# Patient Record
Sex: FEMALE | Race: WHITE | Employment: FULL TIME | URBAN - METROPOLITAN AREA
[De-identification: names, ages, dates, MRNs, and addresses within clinical notes are randomized per-mention and may not be internally consistent; named-entity substitution may affect disease eponyms.]

---

## 2019-07-29 ENCOUNTER — ANESTHESIA EVENT (OUTPATIENT)
Dept: SURGERY | Age: 44
End: 2019-07-29
Payer: COMMERCIAL

## 2019-07-29 RX ORDER — SODIUM CHLORIDE 0.9 % (FLUSH) 0.9 %
5-40 SYRINGE (ML) INJECTION AS NEEDED
Status: CANCELLED | OUTPATIENT
Start: 2019-07-29

## 2019-07-29 RX ORDER — SODIUM CHLORIDE 0.9 % (FLUSH) 0.9 %
5-40 SYRINGE (ML) INJECTION EVERY 8 HOURS
Status: CANCELLED | OUTPATIENT
Start: 2019-07-29

## 2019-07-30 ENCOUNTER — HOSPITAL ENCOUNTER (OUTPATIENT)
Age: 44
Setting detail: OUTPATIENT SURGERY
Discharge: HOME | End: 2019-07-30
Attending: ORTHOPAEDIC SURGERY | Admitting: ORTHOPAEDIC SURGERY
Payer: COMMERCIAL

## 2019-07-30 ENCOUNTER — ANESTHESIA (OUTPATIENT)
Dept: SURGERY | Age: 44
End: 2019-07-30
Payer: COMMERCIAL

## 2019-07-30 ENCOUNTER — APPOINTMENT (OUTPATIENT)
Dept: GENERAL RADIOLOGY | Age: 44
End: 2019-07-30
Attending: ORTHOPAEDIC SURGERY
Payer: COMMERCIAL

## 2019-07-30 VITALS
RESPIRATION RATE: 16 BRPM | OXYGEN SATURATION: 97 % | WEIGHT: 183 LBS | SYSTOLIC BLOOD PRESSURE: 95 MMHG | HEART RATE: 97 BPM | TEMPERATURE: 98 F | BODY MASS INDEX: 29.54 KG/M2 | DIASTOLIC BLOOD PRESSURE: 47 MMHG

## 2019-07-30 LAB — HCG UR QL: NEGATIVE

## 2019-07-30 PROCEDURE — A4565 SLINGS: HCPCS | Performed by: ORTHOPAEDIC SURGERY

## 2019-07-30 PROCEDURE — 77030010509 HC AIRWY LMA MSK TELE -A: Performed by: ANESTHESIOLOGY

## 2019-07-30 PROCEDURE — 73100 X-RAY EXAM OF WRIST: CPT

## 2019-07-30 PROCEDURE — C1713 ANCHOR/SCREW BN/BN,TIS/BN: HCPCS | Performed by: ORTHOPAEDIC SURGERY

## 2019-07-30 PROCEDURE — 77030033681 HC SPLNT P-CUT SAF BSNM -A: Performed by: ORTHOPAEDIC SURGERY

## 2019-07-30 PROCEDURE — 76210000006 HC OR PH I REC 0.5 TO 1 HR: Performed by: ORTHOPAEDIC SURGERY

## 2019-07-30 PROCEDURE — 74011250636 HC RX REV CODE- 250/636: Performed by: ORTHOPAEDIC SURGERY

## 2019-07-30 PROCEDURE — 77030003867: Performed by: ORTHOPAEDIC SURGERY

## 2019-07-30 PROCEDURE — 77030016570 HC BLNKT BAIR HGGR 3M -B: Performed by: ANESTHESIOLOGY

## 2019-07-30 PROCEDURE — 76010000161 HC OR TIME 1 TO 1.5 HR INTENSV-TIER 1: Performed by: ORTHOPAEDIC SURGERY

## 2019-07-30 PROCEDURE — 77030003900 HC BIT DRL TWST TRIM -C: Performed by: ORTHOPAEDIC SURGERY

## 2019-07-30 PROCEDURE — 81025 URINE PREGNANCY TEST: CPT

## 2019-07-30 PROCEDURE — 76210000020 HC REC RM PH II FIRST 0.5 HR: Performed by: ORTHOPAEDIC SURGERY

## 2019-07-30 PROCEDURE — 74011250636 HC RX REV CODE- 250/636

## 2019-07-30 PROCEDURE — 76942 ECHO GUIDE FOR BIOPSY: CPT | Performed by: ORTHOPAEDIC SURGERY

## 2019-07-30 PROCEDURE — 77030000032 HC CUF TRNQT ZIMM -B: Performed by: ORTHOPAEDIC SURGERY

## 2019-07-30 PROCEDURE — 77030002966 HC SUT PDS J&J -A: Performed by: ORTHOPAEDIC SURGERY

## 2019-07-30 PROCEDURE — 77030032490 HC SLV COMPR SCD KNE COVD -B: Performed by: ORTHOPAEDIC SURGERY

## 2019-07-30 PROCEDURE — 77030008853 HC WRE K TRIM -B: Performed by: ORTHOPAEDIC SURGERY

## 2019-07-30 PROCEDURE — 77030018836 HC SOL IRR NACL ICUM -A: Performed by: ORTHOPAEDIC SURGERY

## 2019-07-30 PROCEDURE — 76010010054 HC POST OP PAIN BLOCK: Performed by: ORTHOPAEDIC SURGERY

## 2019-07-30 PROCEDURE — 74011000250 HC RX REV CODE- 250

## 2019-07-30 PROCEDURE — 77030003602 HC NDL NRV BLK BBMI -B: Performed by: ANESTHESIOLOGY

## 2019-07-30 PROCEDURE — 77030008593 HC TRAP FNGR MESH ALLN -B: Performed by: ORTHOPAEDIC SURGERY

## 2019-07-30 PROCEDURE — 77030039266 HC ADH SKN EXOFIN S2SG -A: Performed by: ORTHOPAEDIC SURGERY

## 2019-07-30 PROCEDURE — 77030002916 HC SUT ETHLN J&J -A: Performed by: ORTHOPAEDIC SURGERY

## 2019-07-30 PROCEDURE — 76060000033 HC ANESTHESIA 1 TO 1.5 HR: Performed by: ORTHOPAEDIC SURGERY

## 2019-07-30 PROCEDURE — 74011250636 HC RX REV CODE- 250/636: Performed by: ANESTHESIOLOGY

## 2019-07-30 DEVICE — IMPLANTABLE DEVICE: Type: IMPLANTABLE DEVICE | Site: ARM | Status: FUNCTIONAL

## 2019-07-30 DEVICE — PEG WRST THRD VOLAR 18MM --: Type: IMPLANTABLE DEVICE | Site: ARM | Status: FUNCTIONAL

## 2019-07-30 DEVICE — PLATE VOLAR 4 DIS PEG 3HLE LF --: Type: IMPLANTABLE DEVICE | Site: ARM | Status: FUNCTIONAL

## 2019-07-30 RX ORDER — CEFAZOLIN SODIUM/WATER 2 G/20 ML
2 SYRINGE (ML) INTRAVENOUS ONCE
Status: COMPLETED | OUTPATIENT
Start: 2019-07-30 | End: 2019-07-30

## 2019-07-30 RX ORDER — DEXAMETHASONE SODIUM PHOSPHATE 4 MG/ML
INJECTION, SOLUTION INTRA-ARTICULAR; INTRALESIONAL; INTRAMUSCULAR; INTRAVENOUS; SOFT TISSUE
Status: COMPLETED | OUTPATIENT
Start: 2019-07-30 | End: 2019-07-30

## 2019-07-30 RX ORDER — FENTANYL CITRATE 50 UG/ML
100 INJECTION, SOLUTION INTRAMUSCULAR; INTRAVENOUS ONCE
Status: COMPLETED | OUTPATIENT
Start: 2019-07-30 | End: 2019-07-30

## 2019-07-30 RX ORDER — SODIUM CHLORIDE 0.9 % (FLUSH) 0.9 %
5-40 SYRINGE (ML) INJECTION EVERY 8 HOURS
Status: DISCONTINUED | OUTPATIENT
Start: 2019-07-30 | End: 2019-07-30 | Stop reason: HOSPADM

## 2019-07-30 RX ORDER — ONDANSETRON 2 MG/ML
INJECTION INTRAMUSCULAR; INTRAVENOUS AS NEEDED
Status: DISCONTINUED | OUTPATIENT
Start: 2019-07-30 | End: 2019-07-30 | Stop reason: HOSPADM

## 2019-07-30 RX ORDER — SODIUM CHLORIDE 0.9 % (FLUSH) 0.9 %
5-40 SYRINGE (ML) INJECTION AS NEEDED
Status: DISCONTINUED | OUTPATIENT
Start: 2019-07-30 | End: 2019-07-30 | Stop reason: HOSPADM

## 2019-07-30 RX ORDER — SODIUM CHLORIDE, SODIUM LACTATE, POTASSIUM CHLORIDE, CALCIUM CHLORIDE 600; 310; 30; 20 MG/100ML; MG/100ML; MG/100ML; MG/100ML
100 INJECTION, SOLUTION INTRAVENOUS CONTINUOUS
Status: DISCONTINUED | OUTPATIENT
Start: 2019-07-30 | End: 2019-07-30 | Stop reason: HOSPADM

## 2019-07-30 RX ORDER — PROPOFOL 10 MG/ML
INJECTION, EMULSION INTRAVENOUS AS NEEDED
Status: DISCONTINUED | OUTPATIENT
Start: 2019-07-30 | End: 2019-07-30 | Stop reason: HOSPADM

## 2019-07-30 RX ORDER — DEXAMETHASONE SODIUM PHOSPHATE 4 MG/ML
INJECTION, SOLUTION INTRA-ARTICULAR; INTRALESIONAL; INTRAMUSCULAR; INTRAVENOUS; SOFT TISSUE AS NEEDED
Status: DISCONTINUED | OUTPATIENT
Start: 2019-07-30 | End: 2019-07-30 | Stop reason: HOSPADM

## 2019-07-30 RX ORDER — NAPROXEN SODIUM 220 MG
440 TABLET ORAL 2 TIMES DAILY WITH MEALS
COMMUNITY
End: 2020-06-19 | Stop reason: CLARIF

## 2019-07-30 RX ORDER — FLUMAZENIL 0.1 MG/ML
0.2 INJECTION INTRAVENOUS
Status: DISCONTINUED | OUTPATIENT
Start: 2019-07-30 | End: 2019-07-30 | Stop reason: HOSPADM

## 2019-07-30 RX ORDER — DIPHENHYDRAMINE HYDROCHLORIDE 50 MG/ML
12.5 INJECTION, SOLUTION INTRAMUSCULAR; INTRAVENOUS
Status: DISCONTINUED | OUTPATIENT
Start: 2019-07-30 | End: 2019-07-30 | Stop reason: HOSPADM

## 2019-07-30 RX ORDER — NALOXONE HYDROCHLORIDE 0.4 MG/ML
0.2 INJECTION, SOLUTION INTRAMUSCULAR; INTRAVENOUS; SUBCUTANEOUS AS NEEDED
Status: DISCONTINUED | OUTPATIENT
Start: 2019-07-30 | End: 2019-07-30 | Stop reason: HOSPADM

## 2019-07-30 RX ORDER — MIDAZOLAM HYDROCHLORIDE 1 MG/ML
2 INJECTION, SOLUTION INTRAMUSCULAR; INTRAVENOUS ONCE
Status: COMPLETED | OUTPATIENT
Start: 2019-07-30 | End: 2019-07-30

## 2019-07-30 RX ORDER — ACETAMINOPHEN 500 MG
1000 TABLET ORAL ONCE
Status: DISCONTINUED | OUTPATIENT
Start: 2019-07-30 | End: 2019-07-30 | Stop reason: HOSPADM

## 2019-07-30 RX ORDER — OXYCODONE HYDROCHLORIDE 5 MG/1
10 TABLET ORAL
Status: DISCONTINUED | OUTPATIENT
Start: 2019-07-30 | End: 2019-07-30 | Stop reason: HOSPADM

## 2019-07-30 RX ORDER — EPHEDRINE SULFATE 50 MG/ML
INJECTION, SOLUTION INTRAVENOUS AS NEEDED
Status: DISCONTINUED | OUTPATIENT
Start: 2019-07-30 | End: 2019-07-30 | Stop reason: HOSPADM

## 2019-07-30 RX ORDER — OXYCODONE HYDROCHLORIDE 5 MG/1
5 TABLET ORAL
Status: DISCONTINUED | OUTPATIENT
Start: 2019-07-30 | End: 2019-07-30 | Stop reason: HOSPADM

## 2019-07-30 RX ORDER — MIDAZOLAM HYDROCHLORIDE 1 MG/ML
2 INJECTION, SOLUTION INTRAMUSCULAR; INTRAVENOUS
Status: DISCONTINUED | OUTPATIENT
Start: 2019-07-30 | End: 2019-07-30 | Stop reason: HOSPADM

## 2019-07-30 RX ORDER — ONDANSETRON 8 MG/1
8 TABLET, ORALLY DISINTEGRATING ORAL
Qty: 12 TAB | Refills: 1 | Status: SHIPPED | OUTPATIENT
Start: 2019-07-30 | End: 2019-09-06 | Stop reason: ALTCHOICE

## 2019-07-30 RX ORDER — MIDAZOLAM HYDROCHLORIDE 1 MG/ML
INJECTION, SOLUTION INTRAMUSCULAR; INTRAVENOUS AS NEEDED
Status: DISCONTINUED | OUTPATIENT
Start: 2019-07-30 | End: 2019-07-30 | Stop reason: HOSPADM

## 2019-07-30 RX ORDER — HYDROMORPHONE HYDROCHLORIDE 2 MG/ML
0.5 INJECTION, SOLUTION INTRAMUSCULAR; INTRAVENOUS; SUBCUTANEOUS
Status: DISCONTINUED | OUTPATIENT
Start: 2019-07-30 | End: 2019-07-30 | Stop reason: HOSPADM

## 2019-07-30 RX ORDER — LIDOCAINE HYDROCHLORIDE 10 MG/ML
0.1 INJECTION INFILTRATION; PERINEURAL AS NEEDED
Status: DISCONTINUED | OUTPATIENT
Start: 2019-07-30 | End: 2019-07-30 | Stop reason: HOSPADM

## 2019-07-30 RX ORDER — PROPOFOL 10 MG/ML
INJECTION, EMULSION INTRAVENOUS
Status: DISCONTINUED | OUTPATIENT
Start: 2019-07-30 | End: 2019-07-30 | Stop reason: HOSPADM

## 2019-07-30 RX ORDER — LIDOCAINE HYDROCHLORIDE 20 MG/ML
INJECTION, SOLUTION EPIDURAL; INFILTRATION; INTRACAUDAL; PERINEURAL AS NEEDED
Status: DISCONTINUED | OUTPATIENT
Start: 2019-07-30 | End: 2019-07-30 | Stop reason: HOSPADM

## 2019-07-30 RX ADMIN — PROPOFOL 70 MG: 10 INJECTION, EMULSION INTRAVENOUS at 10:45

## 2019-07-30 RX ADMIN — EPHEDRINE SULFATE 5 MG: 50 INJECTION, SOLUTION INTRAVENOUS at 11:14

## 2019-07-30 RX ADMIN — EPHEDRINE SULFATE 10 MG: 50 INJECTION, SOLUTION INTRAVENOUS at 11:05

## 2019-07-30 RX ADMIN — DEXAMETHASONE SODIUM PHOSPHATE 4 MG: 4 INJECTION, SOLUTION INTRA-ARTICULAR; INTRALESIONAL; INTRAMUSCULAR; INTRAVENOUS; SOFT TISSUE at 11:07

## 2019-07-30 RX ADMIN — ONDANSETRON 4 MG: 2 INJECTION INTRAMUSCULAR; INTRAVENOUS at 11:09

## 2019-07-30 RX ADMIN — FENTANYL CITRATE 100 MCG: 50 INJECTION INTRAMUSCULAR; INTRAVENOUS at 09:14

## 2019-07-30 RX ADMIN — Medication 2 G: at 10:29

## 2019-07-30 RX ADMIN — EPHEDRINE SULFATE 10 MG: 50 INJECTION, SOLUTION INTRAVENOUS at 11:16

## 2019-07-30 RX ADMIN — PROPOFOL 40 MG: 10 INJECTION, EMULSION INTRAVENOUS at 10:28

## 2019-07-30 RX ADMIN — MIDAZOLAM HYDROCHLORIDE 1 MG: 1 INJECTION, SOLUTION INTRAMUSCULAR; INTRAVENOUS at 10:27

## 2019-07-30 RX ADMIN — DEXAMETHASONE SODIUM PHOSPHATE 4 MG: 4 INJECTION, SOLUTION INTRA-ARTICULAR; INTRALESIONAL; INTRAMUSCULAR; INTRAVENOUS; SOFT TISSUE at 09:24

## 2019-07-30 RX ADMIN — LIDOCAINE HYDROCHLORIDE 20 MG: 20 INJECTION, SOLUTION EPIDURAL; INFILTRATION; INTRACAUDAL; PERINEURAL at 10:27

## 2019-07-30 RX ADMIN — PROPOFOL 120 MCG/KG/MIN: 10 INJECTION, EMULSION INTRAVENOUS at 10:32

## 2019-07-30 RX ADMIN — SODIUM CHLORIDE, SODIUM LACTATE, POTASSIUM CHLORIDE, AND CALCIUM CHLORIDE 100 ML/HR: 600; 310; 30; 20 INJECTION, SOLUTION INTRAVENOUS at 09:06

## 2019-07-30 RX ADMIN — EPHEDRINE SULFATE 5 MG: 50 INJECTION, SOLUTION INTRAVENOUS at 11:25

## 2019-07-30 RX ADMIN — MIDAZOLAM HYDROCHLORIDE 2 MG: 2 INJECTION, SOLUTION INTRAMUSCULAR; INTRAVENOUS at 09:14

## 2019-07-30 NOTE — DISCHARGE INSTRUCTIONS
POST OP INSTRUCTIONS      1. Patient has appointment for 8/8/19 at 1:35 PM at the [facility]. 2.  For problems call Dr Eb Trent, [address] [facility],  [phone]          Appointments only,  [phone]    3. Ice and elevate the surgical site. 4.  Keep splints and dressing dry and intact. 5.  If able to tolerate, take   Acetaminophen 325 mg  2 every 4 hrs   And                                               Ibuprofen 200 mg   3 every 6 hrs   OR   Aleve 220 mg 2 every 12 hrs to help with pain                                                   ( Do not take Ibuprofen or Aleve if taking any other anti inflamatory                                                    drug, NSAID, or anti arthritis drug or if taking blood thinners.)                                                 Vitamin C   500 mg  Once a day for 2 months. After general anesthesia or intravenous sedation, for 24 hours or while taking prescription Narcotics:  · Limit your activities  · A responsible adult needs to be with you for the next 24 hours  · Do not drive and operate hazardous machinery  · Do not make important personal or business decisions  · Do  not drink alcoholic beverages  · If you have not urinated within 8 hours after discharge, please contact your surgeon on call. *  Please give a list of your current medications to your Primary Care Provider. *  Please update this list whenever your medications are discontinued, doses are      changed, or new medications (including over-the-counter products) are added. *  Please carry medication information at all times in case of emergency situations. These are general instructions for a healthy lifestyle:  No smoking/ No tobacco products/ Avoid exposure to second hand smoke  Surgeon General's Warning:  Quitting smoking now greatly reduces serious risk to your health.   Obesity, smoking, and sedentary lifestyle greatly increases your risk for illness  A healthy diet, regular physical exercise & weight monitoring are important for maintaining a healthy lifestyle    You may be retaining fluid if you have a history of heart failure or if you experience any of the following symptoms:  Weight gain of 3 pounds or more overnight or 5 pounds in a week, increased swelling in our hands or feet or shortness of breath while lying flat in bed. Please call your doctor as soon as you notice any of these symptoms; do not wait until your next office visit.

## 2019-07-30 NOTE — H&P
Outpatient Surgery History and Physical:  Kevin Barone was seen and examined. CHIEF COMPLAINT:   Injury of the right wrist  HPI:  The patient was standing on her bed cleaning a ceiling fan yesterday. .   She fell off breaking her right wrist.  X-Rays showed a very comminuted  Intra-articular fracture of the distal radius. The ulnar styloid was avulsed  also. A closed manipulation helped some but still needs ORIF. PE:   There were no vitals taken for this visit. Heart:   Regular rhythm      Lungs:  Are clear      Past Medical History: There are no active problems to display for this patient. Surgical History:   Past Surgical History:   Procedure Laterality Date    HX GYN  1994    removal of cyst from vulva    HX HEENT      wisdom teeth       Social History: Patient  reports that she has never smoked. She has never used smokeless tobacco. She reports that she drinks alcohol. She reports that she does not use drugs. Family History:   Family History   Problem Relation Age of Onset    Post-op Nausea/Vomiting Mother     Prostate Cancer Father     No Known Problems Maternal Grandmother     Heart Disease Maternal Grandfather        Allergies: Reviewed per EMR  Allergies   Allergen Reactions    Atropine Unable to Obtain     Pt is unsure- occurred as a child    Periactin Unable to Obtain     Pt is unsure- occurred as a child       Medications:    No current facility-administered medications on file prior to encounter. Current Outpatient Medications on File Prior to Encounter   Medication Sig    amoxicillin-clavulanate (AUGMENTIN) 875-125 mg per tablet Take 1 Tab by mouth every twelve (12) hours.  spironolactone (ALDACTONE) 50 mg tablet     SUMAtriptan (IMITREX) 100 mg tablet Take 100 mg by mouth.  amitriptyline (ELAVIL) 50 mg tablet Take 1 Tab by mouth nightly.  norgestimate-ethinyl estradiol (ORTHO TRI-CYCLEN, TRI-SPRINTEC) 0.18/0.215/0.25 mg-35 mcg (28) tab Take 1 Tab by mouth. Dx:   Comminuted intra-articular fx of the right distal radius and ulnar styloid. The surgery is planned for an ORIF of the right distal radius fracture. Patient identified by surgeon; surgical site was confirmed by patient and surgeon. The patient is here today for outpatient surgery. I have examined the patient, no changes are noted in the patient's medical status. Necessity for the procedure/care is still present and the history and physical above is current. See the office notes for the full long term history of the problem. Please see the recent office notes for the musculoskeletal examination.     Signed By: Obinna Cortez MD     July 30, 2019 7:57 AM

## 2019-07-30 NOTE — ANESTHESIA POSTPROCEDURE EVALUATION
Procedure(s):  RIGHT RADIUS OPEN REDUCTION INTERNAL FIXATION AXILLARY. total IV anesthesia    Anesthesia Post Evaluation      Multimodal analgesia: multimodal analgesia used between 6 hours prior to anesthesia start to PACU discharge  Patient location during evaluation: PACU  Patient participation: complete - patient participated  Level of consciousness: awake and alert  Pain management: adequate  Airway patency: patent  Anesthetic complications: no  Cardiovascular status: acceptable and hemodynamically stable  Respiratory status: acceptable  Hydration status: acceptable        Vitals Value Taken Time   BP 95/47 7/30/2019 12:12 PM   Temp 36.7 °C (98 °F) 7/30/2019 12:12 PM   Pulse 97 7/30/2019 12:16 PM   Resp 16 7/30/2019 12:12 PM   SpO2 97 % 7/30/2019 12:16 PM   Vitals shown include unvalidated device data.

## 2019-07-30 NOTE — ANESTHESIA PROCEDURE NOTES
Peripheral Block    Start time: 7/30/2019 9:14 AM  End time: 7/30/2019 9:24 AM  Performed by: Herb Calhoun MD  Authorized by: Herb Calhoun MD       Pre-procedure:    Indications: at surgeon's request and post-op pain management    Preanesthetic Checklist: patient identified, risks and benefits discussed, site marked, timeout performed, anesthesia consent given and patient being monitored      Block Type:   Block Type:  Axillary  Laterality:  Right  Monitoring:  Continuous pulse ox, frequent vital sign checks, heart rate, responsive to questions and oxygen  Injection Technique:  Single shot  Procedures: ultrasound guided and nerve stimulator    Prep: chlorhexidine    Location:  Axilla  Needle Type:  Stimuplex  Needle Gauge:  20 G  Needle Localization:  Anatomical landmarks, infiltration, ultrasound guidance and nerve stimulator  Motor Response: minimal motor response >0.4 mA      Assessment:  Number of attempts:  1  Injection Assessment:  Incremental injection every 5 mL, negative aspiration for CSF, local visualized surrounding nerve on ultrasound, negative aspiration for blood, no paresthesia, no intravascular symptoms and ultrasound image on chart  Patient tolerance:  Patient tolerated the procedure well with no immediate complications

## 2019-07-30 NOTE — OP NOTES
Open reduction, internal fixation  interarticular distal radius fracture greater than three parts  Procedure Note    Narciso Layne      7/30/2019     Indications: Narciso Layne was admitted to the hospital with a brief history of right Intraarticular Distal Radius Fracture. Pre-operative diagnosis:  Comminuted Intra-articular Fracture of the Right Distal Radius    Post-operative diagnosis: Same    Procedures: ORIF of Multi-part Intra-articular Fracture of the Right Distal Radius    Surgeon:  Elder Shabazz MD    Anesthesia: Regional and General    Procedure Details  The patient was brought to the operative suite and placed in the supine position. After adequate anesthesia was achieved, the patient had tourniquet applied to the right arm over adequate padding of a Sof-Rol. It was preset to the level of 250 mmHg. The right upper extremity was then prepped and draped in the usual sterile fashion. A timeout was held identifying the patient, surgeon, procedure and operative site. The patient had been given IV ancef for prophylaxis. The extremity was elevated and exsanguinated with an esmarch wrap and the  tourniquet  inflated. The right wrist was manipulated and then placed in 10 lbs of traction through finger traps on the index and middle fingers. The position was checked with the c-arm and was in generally good position. An incision was made over the volar aspect of the wrist beginning at the proximal wrist crease extending along the flexor carpi radialis tendon. The dissection was carried down through the subcutaneous tissue. Electrocautery was used to coagulate small bleeders. The fascia was incised along the line of the skin incision. The flexor pollicis longus and pronator quadratus were taken down off the radius and reflected ulnarward. There was severe comminution of the articular surface. There was a large radial styloid fragment that was split in two planes.   There was an ulnar-based volar fragment and again there were multiple  fragments. The joint was reduced by fluoroscopic guidance. A TriMed standard variable angle volar plate was positoned with the aid of the c-arm. It was temporarily held in place  with K wires. The fractures were then secured using a volarly-based distal radial locking plate attached with both locking and non locking screws. Multiple  fluoroscopic images including  AP and lateral confirmed acceptable reduction of the joint and no overly long screws. Alignment was restored. There were no screws within the joint. The distal ulna seemed to be stable and there was good rotation of the forearm without crepitance. The wound was then irrigated with normal saline and closed in layers using 3-0 PDS for the pronator and FCR sheath and 4-0 nylon for the skin. A sterile compressive dressing and a volar  splint were applied. Tourniquet was deflated. The patient was then  transferred to the recovery room in good condition under the care of Anesthesia. Total Tourniquet Time Documented:  Upper Arm (Right) - 46 minutes  Total: Upper Arm (Right) - 46 minutes      Implant:   Implant Name Type Inv. Item Serial No.  Lot No. LRB No. Used   SCR BNE ENA HEX 3.2X11MM --  - [id number]  SCR BNE ENA HEX 3.2X11MM --   TRIMED [id number] Right 1   PLATE VOLAR 4 DIS PEG 3HLE LF --  - [id number]  PLATE VOLAR 4 DIS PEG 3HLE LF --   TRIMED [id number] Right 1   SCR ENA LCK 3.2X10MM --  - [id number]  SCR ENA LCK 3.2X10MM --   TRIMED [id number] Right 2   WIRE K 1.2V494XE --  - [id number]  WIRE K 1.9S961SR --   TRIMED [id number] Right 1   PEG WRST THRD VOLAR 18MM --  - [id number]  PEG WRST THRD VOLAR 18MM --   TRIMED [id number] Right 3   PEG VOLAR UNTHRDED 14MM -- TORX PEG - [id number]  PEG VOLAR UNTHRDED 14MM -- TORX PEG  TRIMED [id number] Right 1       Complications: None.       Signed By: Zuri Kamara MD

## 2019-07-30 NOTE — ANESTHESIA PREPROCEDURE EVALUATION
Relevant Problems   No relevant active problems       Anesthetic History   No history of anesthetic complications            Review of Systems / Medical History  Patient summary reviewed and pertinent labs reviewed    Pulmonary  Within defined limits                 Neuro/Psych         Headaches     Cardiovascular                  Exercise tolerance: >4 METS     GI/Hepatic/Renal  Within defined limits              Endo/Other        Obesity     Other Findings              Physical Exam    Airway  Mallampati: II  TM Distance: 4 - 6 cm  Neck ROM: normal range of motion   Mouth opening: Normal     Cardiovascular    Rhythm: regular  Rate: normal         Dental         Pulmonary  Breath sounds clear to auscultation               Abdominal         Other Findings            Anesthetic Plan    ASA: 2  Anesthesia type: total IV anesthesia      Post-op pain plan if not by surgeon: peripheral nerve block single    Induction: Intravenous  Anesthetic plan and risks discussed with: Patient, Mother and Father

## 2019-09-12 ENCOUNTER — HOSPITAL ENCOUNTER (OUTPATIENT)
Dept: PHYSICAL THERAPY | Age: 44
Discharge: HOME | End: 2019-09-12
Payer: COMMERCIAL

## 2019-09-12 PROCEDURE — 97165 OT EVAL LOW COMPLEX 30 MIN: CPT

## 2019-09-12 PROCEDURE — 97140 MANUAL THERAPY 1/> REGIONS: CPT

## 2019-09-12 PROCEDURE — 97022 WHIRLPOOL THERAPY: CPT

## 2019-09-12 PROCEDURE — 97110 THERAPEUTIC EXERCISES: CPT

## 2019-09-16 NOTE — THERAPY EVALUATION
Vale Organ  : 1975  Primary: [employer]  Secondary:  [address] at [place] [id number] [facility], [address] [place] [address],[address], [address] [facility]  [phone]   [phone]          OUTPATIENT OCCUPATIONAL THERAPY:Initial Assessment 2019   ICD-10: Treatment Diagnosis: Pain in right wrist (M25.531)Stiffness of right wrist, not elsewhere classified (M25.631)  Precautions/Allergies:   Atropine and Periactin   TREATMENT PLAN:  Effective Dates: 2019 TO 12/15/2019 (90 days). Frequency/Duration: 2 times a week for 90 Day(s) MEDICAL/REFERRING DIAGNOSIS:  HAND  Right distal radius fracture  DATE OF ONSET: 2019  DATE OF SURGERY: 2019  REFERRING PHYSICIAN: Navin Reich MD MD Orders: Evaluate and treat  Return MD Appointment: 4 weeks     INITIAL ASSESSMENT:   Ms. Kushal Rodas presents with decreased functional use, strength and range of motion of her right wrist and upper extremity that is affecting her independence with activities of daily living and ability to perform job tasks. I feel that Ms. Kushal Rodas will benefit from skilled occupational therapy to maximize the functional use of her wrist and upper extremity in daily activities and work tasks. PROBLEM LIST (Impacting functional limitations):  1. Pain in right wrist.  2. Decreased motion in right wrist.  3. Decreased strength in right hand. INTERVENTIONS PLANNED: (Treatment may consist of any combination of the following)  1. Modalities that may include fluidotherapy, paraffin, ultrasound, and light therapy. 2. Therapeutic exercise including a home exercise program.  3. Manual therapy. 4. Therapeutic activities. GOALS: (Goals have been discussed and agreed upon with patient.)  Short-Term Functional Goals: Time Frame: 4 weeks  1. Decrease pain to 4 to allow patient to perform self care tasks.   2. Increase motion in right wrist by 20 degrees to improve functional use of upper extremity in ADL activities. 3. Increase strength in right hand by 10 pounds to allow patient to  and lift objects during self care activities. Discharge Goals: Time Frame: 12 weeks  1. Decrease pain to 2 to allow patient to perform all household and work tasks. 2. Increase motion in right wrist by 40 degreees to allow patient to perform all ADL activities. 3. Increase strength in right hand by 20 pounds to allow patient to , lift, hold, and carry heavy objects. OUTCOME MEASURE:   Tool Used: Disabilities of the Arm, Shoulder and Hand (DASH) Questionnaire - Quick Version  Score:  Initial: 40/55  Most Recent: X/55 (Date: -- )   Interpretation of Score: The DASH is designed to measure the activities of daily living in person's with upper extremity dysfunction or pain. Each section is scored on a 1-5 scale, 5 representing the greatest disability. The scores of each section are added together for a total score of 55. MEDICAL NECESSITY:   · Patient is expected to demonstrate progress in strength and range of motion to increase independence with ADL, household and work activities. .  REASON FOR SERVICES/OTHER COMMENTS:  · The patient has limited motion, strength and function in her right U.E..  Total Duration:  OT Patient Time In/Time Out  Time In: 0445  Time Out: 0545    Rehabilitation Potential For Stated Goals: Good  Regarding Kobi Audrey therapy, I certify that the treatment plan above will be carried out by a therapist or under their direction. Thank you for this referral,  Jeanine Palumbo, OT     Referring Physician Signature: Renu Arriaga MD No Signature is Required for this note. PAIN/SUBJECTIVE:   Initial: Pain Intensity 1: 2(increasing to 5 when most intense)  Pain Location 1: Hand, Wrist  Pain Orientation 1: Right   Post Session:  3/10   OCCUPATIONAL PROFILE & HISTORY:   History of Injury/Illness (Reason for Referral):   The patient fell off her bed when she was cleaning her ceiling fan injuring her right wrist.  Past Medical History/Comorbidities:   Ms. Monique Goff  has a past medical history of Chronic pain and Migraines. Ms. Monique Goff  has a past surgical history that includes hx gyn (1994) and hx heent. Social History/Living Environment:   Home Environment: Private residence  Prior Level of Function/Work/Activity:  independent  Dominant Side:         RIGHT   Ambulatory/Rehab Services H2 Model Falls Risk Assessment   Risk Factors:       No Risk Factors Identified Ability to Rise from Chair:       (0)  Ability to rise in a single movement   Falls Prevention Plan:       No modifications necessary   Total: (5 or greater = High Risk): 0   ©2010 [facility] of [facility]. All Rights Reserved. [facility] Patent #[id number]. Federal Law prohibits the replication, distribution or use without written permission from [facility] [employer]   Current Medications:       Current Outpatient Medications:     SUMAtriptan (IMITREX) 100 mg tablet, Take 1 Tab by mouth once as needed for Migraine for up to 1 dose., Disp: 12 Tab, Rfl: 3    amoxicillin-clavulanate (AUGMENTIN) 875-125 mg per tablet, Take 1 Tab by mouth every twelve (12) hours. , Disp: 20 Tab, Rfl: 0    naproxen sodium (ALEVE) 220 mg tablet, Take 440 mg by mouth two (2) times daily (with meals). , Disp: , Rfl:     spironolactone (ALDACTONE) 50 mg tablet, , Disp: , Rfl: 6    amitriptyline (ELAVIL) 50 mg tablet, Take 1 Tab by mouth nightly., Disp: 90 Tab, Rfl: 3    norgestimate-ethinyl estradiol (ORTHO TRI-CYCLEN, TRI-SPRINTEC) 0.18/0.215/0.25 mg-35 mcg (28) tab, Take 1 Tab by mouth., Disp: , Rfl:    Date Last Reviewed:  9/12/2019   Complexity Level: Brief history (0):  LOW COMPLEXITY   ASSESSMENT OF OCCUPATIONAL PERFORMANCE:   RANGE OF MOTION:     · AROM: right wrist motion is as follows: flexion 10, extension 15, U.D. 20, R.D. 10, supination 20, pronation 70 degrees. STRENGTH:  Not tested yet.             SENSATION:  Intact           Physical Skills Involved:  1. Range of Motion  2. Strength  3. Pain (acute)  4. Edema Cognitive Skills Affected (resulting in the inability to perform in a timely and safe manner):   1. none Psychosocial Skills Affected:  1. none   Number of elements that affect the Plan of Care[de-identified] 3-5:  MODERATE COMPLEXITY   CLINICAL DECISION MAKING:   Clinical Decision-Making Assessment:     Assessment process, impact of co-morbidities, assessment modification\need for assistance, and selection of interventions: Analytical Complexity:LOW COMPLEXITY

## 2019-09-16 NOTE — PROGRESS NOTES
Liana King  : 1975  Primary: [employer]  Secondary:  [address] at [place] [address] [facility], [address] [place] [address] [address], [address] [facility]  [phone]   [phone]       OUTPATIENT OCCUPATIONAL THERAPY: Daily Treatment Note 2019  Visit Count:  1  CD-10: Treatment Diagnosis: Pain in right wrist (M25.531)Stiffness of right wrist, not elsewhere classified (M25.631)  Precautions/Allergies:   Atropine and Periactin   TREATMENT PLAN:  Effective Dates: 2019 TO 12/15/2019 (90 days).   Frequency/Duration: 2 times a week for 90 Day(s  Pre-treatment Symptoms/Complaints:  Pain and stiffness in right wrist.  Pain: Initial: Pain Intensity 1: 2(increasing to 5 when most intense)  Pain Location 1: Hand, Wrist  Pain Orientation 1: Right  Post Session:  3/10   Medications Last Reviewed:  2019  Updated Objective Findings:  See evaluation note from today   TREATMENT:       Manual Therapy: (Soft Tissue Mobilization Duration  Duration: 15 Minutes  Duration: 15 Minutes): Technique: Retrograde massage(followed by PROM)  Tissue Mobilized: Connective tissue  RUE Soft Tissue Mobilization: Yes  Technique: Retrograde massage, Connective tissue  Tissue Mobilized: Scar/adhesion   Therapeutic Exercise:                                                                               : The patient was instructed in a home exercise program.                                                Date:  19 Date: Date: Date: Date:   Activity/Exercise Parameters Parameters Parameters Parameters Parameters   AROM during Fluidotherapy 20 min       Paraffin with Stretch          Retrograde massage, Friction Scar massage, Joint Mobilization   15 min       Scarf Curl   3 min       Washer Game 3 min       Individual Gripper          Hand [place]          Cones          Pegs          Clothes Pins          A-R Bar          Exerstick          Velcro-Roll          AROM Ex 10 min       RESISTIVE EXERCISES Weight/ Sets/Reps   Weight/ Sets/Reps Weight/ Sets/Reps Weight/ Sets/Reps Weight/ Sets/Reps   WEIGHT WELL        Sup/Pro        UD/RD        Wrist Flex/Ext        Free Weights          UBE(Minutes)          Nautilus        Compound Row        Vertical Chest        Overhead Press                    HEP: As above; handouts given to patient for all exercises. Therapeutic Modalities:      Right Wrist Heat  Type: Fluidotherapy(while perfoming AROM ex)  Duration : 20 minutes  Patient Position: Sitting                                        Joint Mobilization:        Treatment Times:  · Therapeutic Exercise: 15 minutes  · Manual Therapy: 15 minutes  · Parafin:  minutes  · Whirlpool: 15 minutes  · Other:  minutes    Treatment/Session Summary:    · Response to Treatment:  Patients tolerated treatment well with no complications. Upon completion of treatment, skin condition was normal..  · Communication/Consultation:  none today  · Equipment provided today:  None today  · Recommendations/Intent for next treatment session: Next visit will focus on Increasing motion, strength and function in her right UE, and decreasing pain. .    Total Treatment Billable Duration:  45 minutes  OT Patient Time In/Time Out  Time In: 7948  Time Out: Rox Gonzalez, OT    Future Appointments   Date Time Provider Lori Cummins   9/17/2019  4:00 PM Miguel Luo OT [facility] EL[facility]ETH   9/19/2019  4:45 PM [facility]

## 2019-09-17 ENCOUNTER — HOSPITAL ENCOUNTER (OUTPATIENT)
Dept: PHYSICAL THERAPY | Age: 44
Discharge: HOME | End: 2019-09-17
Payer: COMMERCIAL

## 2019-09-17 PROCEDURE — 97018 PARAFFIN BATH THERAPY: CPT

## 2019-09-17 PROCEDURE — 97110 THERAPEUTIC EXERCISES: CPT

## 2019-09-17 PROCEDURE — 97140 MANUAL THERAPY 1/> REGIONS: CPT

## 2019-09-18 NOTE — PROGRESS NOTES
Blanche Emil  : 1975  Primary: [place] Rivers  Secondary:  [address] at [place]  [address], [address], [place] [address].  [phone]   [phone]       OUTPATIENT OCCUPATIONAL THERAPY: Daily Treatment Note 2019  Visit Count:  2  CD-10: Treatment Diagnosis: Pain in right wrist (M25.531)Stiffness of right wrist, not elsewhere classified (M25.631)  Precautions/Allergies:   Atropine and Periactin   TREATMENT PLAN:  Effective Dates: 2019 TO 12/15/2019 (90 days). Frequency/Duration: 2 times a week for 90 Day(s  Pre-treatment Symptoms/Complaints: I am not able to move my wrist much. .  Pain: Initial: Pain Intensity 1: 3  Pain Location 1: Hand, Wrist  Pain Orientation 1: Right  Post Session:  3/10   Medications Last Reviewed:  2019  Updated Objective Findings:  None Today   TREATMENT:       Manual Therapy: (Soft Tissue Mobilization Duration  Duration: 15 Minutes  Duration: 15 Minutes): Technique: Connective tissue, Retrograde massage  Tissue Mobilized: Connective tissue  RUE Soft Tissue Mobilization: Yes  Technique: Retrograde massage, Connective tissue(followed by gentle PROM)  Tissue Mobilized: Scar/adhesion   Therapeutic Exercise:                                                                               : The patient was instructed in a home exercise program.                                                Date:  19 Date:  19 Date: Date: Date:   Activity/Exercise Parameters Parameters Parameters Parameters Parameters   AROM during Fluidotherapy 20 min 15 min      Paraffin with Stretch    15 min      Retrograde massage, Friction Scar massage, Joint Mobilization   15 min 15 min      Scarf Curl   3 min       Washer Game 3 min       Individual Gripper          Hand [place]          Cones          Pegs          Clothes Pins          A-R Bar          Exerstick          Velcro-Roll          AROM Ex 10 min 2 min      RESISTIVE EXERCISES Weight/ Sets/Reps   Weight/ Sets/Reps Weight/ Sets/Reps Weight/ Sets/Reps Weight/ Sets/Reps   WEIGHT WELL        Sup/Pro        UD/RD        Wrist Flex/Ext        Free Weights          UBE(Minutes)          Nautilus        Compound Row        Vertical Chest        Overhead Press                    HEP: As above; handouts given to patient for all exercises. Therapeutic Modalities:      Right Wrist Heat  Type: Paraffin bath(with a supination stretch)  Duration : 15 minutes  Patient Position: Sitting                                        Joint Mobilization:        Treatment Times:  · Therapeutic Exercise: 15 minutes  · Manual Therapy: 15 minutes  · Parafin:15  minutes  · Whirlpool:  minutes  · Other:  minutes    Treatment/Session Summary:    · Response to Treatment:  Patients tolerated treatment well with no complications. Upon completion of treatment, skin condition was normal..  · Communication/Consultation:  none today  · Equipment provided today:  None today  · Recommendations/Intent for next treatment session: Next visit will focus on Increasing motion, strength and function in her right UE, and decreasing pain. .    Total Treatment Billable Duration:  45 minutes  OT Patient Time In/Time Out  Time In: 0400  Time Out: 41 [facility]    Future Appointments   Date Time Provider Lori Cummins   9/19/2019  4:45 PM Lorrin Purchase, OT [facility]   9/24/2019  4:45 PM Lorrin Purchase, OT [facility]   9/26/2019  4:45 PM Lorrin Purchase, OT [facility]   10/1/2019  4:45 PM Lorrin Purchase, OT [facility]   10/3/2019  4:45 PM Fernando Sacks Lovetta Chang, OT [facility]

## 2019-09-19 ENCOUNTER — HOSPITAL ENCOUNTER (OUTPATIENT)
Dept: PHYSICAL THERAPY | Age: 44
Discharge: HOME | End: 2019-09-19
Payer: COMMERCIAL

## 2019-09-19 PROCEDURE — 97018 PARAFFIN BATH THERAPY: CPT

## 2019-09-19 PROCEDURE — 97140 MANUAL THERAPY 1/> REGIONS: CPT

## 2019-09-19 PROCEDURE — 97110 THERAPEUTIC EXERCISES: CPT

## 2019-09-23 NOTE — PROGRESS NOTES
Lucinda Carballo  : 1975  Primary: [facility]  Secondary:  [address] at [place] [id number] [facility], [address] [place] [address],[address], [place] [address].  [phone]   [phone]       OUTPATIENT OCCUPATIONAL THERAPY: Daily Treatment Note 2019  Visit Count:  3  CD-10: Treatment Diagnosis: Pain in right wrist (M25.531)Stiffness of right wrist, not elsewhere classified (M25.631)  Precautions/Allergies:   Atropine and Periactin   TREATMENT PLAN:  Effective Dates: 2019 TO 12/15/2019 (90 days).   Frequency/Duration: 2 times a week for 90 Day(s  Pre-treatment Symptoms/Complaints: I am slowly getting a little more motion in my wrist..  Pain: Initial: Pain Intensity 1: 2  Pain Location 1: Hand, Wrist  Pain Orientation 1: Right  Post Session:  2/10   Medications Last Reviewed:  2019  Updated Objective Findings:  None Today   TREATMENT:       Manual Therapy: (Soft Tissue Mobilization Duration  Duration: 15 Minutes  Duration: 15 Minutes): Technique: Connective tissue, Retrograde massage  Tissue Mobilized: Connective tissue  RUE Soft Tissue Mobilization: Yes  Technique: Retrograde massage, Connective tissue(followed by PROM)   Therapeutic Exercise:                                                                               : The patient was instructed in a home exercise program.                                                Date:  19 Date:  19 Date:  19 Date: Date:   Activity/Exercise Parameters Parameters Parameters Parameters Parameters   AROM during Fluidotherapy 20 min 15 min 20 min     Paraffin with Stretch    15 min 15 min     Retrograde massage, Friction Scar massage, Joint Mobilization   15 min 15 min 15 min     Scarf Curl   3 min  3 min     Washer Game 3 min  2 min     Individual Gripper          Hand [place]          Cones          Pegs          Clothes Pins          A-R Bar          Exerstick     40 reps     Velcro-Roll          AROM Ex 10 min 2 min 2 min RESISTIVE EXERCISES Weight/ Sets/Reps   Weight/ Sets/Reps Weight/ Sets/Reps Weight/ Sets/Reps Weight/ Sets/Reps   WEIGHT WELL        Sup/Pro        UD/RD        Wrist Flex/Ext        Free Weights          UBE(Minutes)          Nautilus        Compound Row        Vertical Chest        Overhead Press                    HEP: As above; handouts given to patient for all exercises. Therapeutic Modalities:      Right Wrist Heat  Type: Paraffin bath  Duration : 15 minutes  Patient Position: Sitting(with a supination stretch)                                        Joint Mobilization:        Treatment Times:  · Therapeutic Exercise: 30 minutes  · Manual Therapy: 15 minutes  · Parafin:15  minutes  · Whirlpool:  minutes  · Other:  minutes    Treatment/Session Summary:    · Response to Treatment:  Patients tolerated treatment well with no complications. Upon completion of treatment, skin condition was normal..  · Communication/Consultation:  none today  · Equipment provided today:  None today  · Recommendations/Intent for next treatment session: Next visit will focus on Increasing motion, strength and function in her right UE, and decreasing pain. .    Total Treatment Billable Duration:  45 minutes  OT Patient Time In/Time Out  Time In: 9099  Time Out: Rox Gonzalez, BISHOP    Future Appointments   Date Time Provider Lori Cummins   9/24/2019  4:45 PM Marycarmen Peguero OT [facility]   9/26/2019  4:45 PM BISHOP Trivedi   10/1/2019  4:45 PM BISHOP Trivedi   10/3/2019  4:45 PM BISHOP Blackburn

## 2019-09-24 ENCOUNTER — HOSPITAL ENCOUNTER (OUTPATIENT)
Dept: PHYSICAL THERAPY | Age: 44
Discharge: HOME | End: 2019-09-24
Payer: COMMERCIAL

## 2019-09-24 PROCEDURE — 97110 THERAPEUTIC EXERCISES: CPT

## 2019-09-24 PROCEDURE — 97018 PARAFFIN BATH THERAPY: CPT

## 2019-09-24 PROCEDURE — 97140 MANUAL THERAPY 1/> REGIONS: CPT

## 2019-09-25 NOTE — PROGRESS NOTES
Johana Lee  : 1975  Primary: [facility]  Secondary:  [address] at [facility]  [address] [facility], [address] [place] [address],[address], [place] [address].  [phone]   [phone]       OUTPATIENT OCCUPATIONAL THERAPY: Daily Treatment Note 2019  Visit Count:  4  CD-10: Treatment Diagnosis: Pain in right wrist (M25.531)Stiffness of right wrist, not elsewhere classified (M25.631)  Precautions/Allergies:   Atropine and Periactin   TREATMENT PLAN:  Effective Dates: 2019 TO 12/15/2019 (90 days). Frequency/Duration: 2 times a week for 90 Day(s  Pre-treatment Symptoms/Complaints: I can not turn my palm up. Pain: Initial: Pain Intensity 1: 3  Pain Location 1: Wrist  Pain Orientation 1: Right  Post Session:  2/10   Medications Last Reviewed:  2019  Updated Objective Findings:  None Today   TREATMENT:       Manual Therapy: (Soft Tissue Mobilization Duration  Duration: 15 Minutes  Duration: 15 Minutes): Technique: Connective tissue, Retrograde massage  Tissue Mobilized: Connective tissue  RUE Soft Tissue Mobilization: Yes  Technique: Retrograde massage, Connective tissue(followed by PROM)  Tissue Mobilized: Scar/adhesion   Therapeutic Exercise:                                                                               : The patient was instructed in a home exercise program.                                                Date:  19 Date:  19 Date:  19 Date:  19 Date:   Activity/Exercise Parameters Parameters Parameters Parameters Parameters   AROM during Fluidotherapy 20 min 15 min 20 min 20 min    Paraffin with Stretch    15 min 15 min 15 min  Sup.     Retrograde massage, Friction Scar massage, Joint Mobilization   15 min 15 min 15 min 15 min    Scarf Curl   3 min  3 min 2 min    Washer Game 3 min  2 min 3 min    Individual Gripper      20 reps    Hand [place]      20 reps    Cones          Pegs          Clothes Pins      20 reps    A-R Bar          Exerstick 40 reps 40 reps    Sup/pro-Roll      3 min    AROM Ex 10 min 2 min 2 min     RESISTIVE EXERCISES Weight/ Sets/Reps   Weight/ Sets/Reps Weight/ Sets/Reps Weight/ Sets/Reps Weight/ Sets/Reps   WEIGHT WELL        Sup/Pro        UD/RD        Wrist Flex/Ext        Free Weights          UBE(Minutes)          Nautilus        Compound Row        Vertical Chest        Overhead Press                    HEP: As above; handouts given to patient for all exercises. Therapeutic Modalities:      Right Wrist Heat  Type: Paraffin bath(with a supination stretch)  Duration : 15 minutes  Patient Position: Sitting                                        Joint Mobilization:        Treatment Times:  · Therapeutic Exercise: 30 minutes  · Manual Therapy: 15 minutes  · Parafin:15  minutes  · Whirlpool:  minutes  · Other:  minutes    Treatment/Session Summary:    · Response to Treatment:  Patients tolerated treatment well with no complications. Upon completion of treatment, skin condition was normal..  · Communication/Consultation:  none today  · Equipment provided today:  None today  · Recommendations/Intent for next treatment session: Next visit will focus on Increasing motion, strength and function in her right UE, and decreasing pain. .    Total Treatment Billable Duration:  45 minutes  OT Patient Time In/Time Out  Time In: 7458  Time Out: Rox 77, OT    Future Appointments   Date Time Provider Lori Cummins   9/26/2019  4:45 PM Chelsea Fontana OT [facility]   10/1/2019  4:45 PM BISHOP Hicks   10/3/2019  4:45 PM BISHOP Blake

## 2019-09-26 ENCOUNTER — HOSPITAL ENCOUNTER (OUTPATIENT)
Dept: PHYSICAL THERAPY | Age: 44
Discharge: HOME | End: 2019-09-26
Payer: COMMERCIAL

## 2019-09-26 PROCEDURE — 97110 THERAPEUTIC EXERCISES: CPT

## 2019-09-26 PROCEDURE — 97018 PARAFFIN BATH THERAPY: CPT

## 2019-09-26 PROCEDURE — 97140 MANUAL THERAPY 1/> REGIONS: CPT

## 2019-09-30 NOTE — PROGRESS NOTES
Venecia Christianson  : 1975  Primary: Fay Rivers  Secondary:  [address] at [facility]  [address] [facility], [address] [place] [address],[address], [place] [address].  [phone]   [phone]       OUTPATIENT OCCUPATIONAL THERAPY: Daily Treatment Note 2019  Visit Count:  5  CD-10: Treatment Diagnosis: Pain in right wrist (M25.531)Stiffness of right wrist, not elsewhere classified (M25.631)  Precautions/Allergies:   Atropine and Periactin   TREATMENT PLAN:  Effective Dates: 2019 TO 12/15/2019 (90 days). Frequency/Duration: 2 times a week for 90 Day(s  Pre-treatment Symptoms/Complaints: It is still difficult to use my right hand to eat with. Pain: Initial: Pain Intensity 1: 2  Pain Location 1: Wrist  Pain Orientation 1: Right  Post Session:  2/10   Medications Last Reviewed:  2019  Updated Objective Findings:  None Today   TREATMENT:       Manual Therapy: (Soft Tissue Mobilization Duration  Duration: 15 Minutes  Duration: 15 Minutes): Technique: Connective tissue, Retrograde massage  Tissue Mobilized: Connective tissue  RUE Soft Tissue Mobilization: Yes  Technique: Retrograde massage, Connective tissue(followed by PROM)  Tissue Mobilized: Scar/adhesion   Therapeutic Exercise:                                                                               : The patient was instructed in a home exercise program.                                                Date:  19 Date:  19 Date:  19 Date:  19 Date:  19   Activity/Exercise Parameters Parameters Parameters Parameters Parameters   AROM during Fluidotherapy 20 min 15 min 20 min 20 min 20 min   Paraffin with Stretch    15 min 15 min 15 min  Sup. 15 min  Sup.    Retrograde massage, Friction Scar massage, Joint Mobilization   15 min 15 min 15 min 15 min 15 min   Scarf Curl   3 min  3 min 2 min 1 min   Washer Game 3 min  2 min 3 min 3 min   Individual Gripper      20 reps 20 reps   Hand [place]      20 reps 20 reps [place]          Pegs          Clothes Pins      20 reps 20 reps   A-R Bar          Exerstick     40 reps 40 reps 40 reps   Sup/pro-Roll      3 min 3 min   AROM Ex 10 min 2 min 2 min  2 min   RESISTIVE EXERCISES Weight/ Sets/Reps   Weight/ Sets/Reps Weight/ Sets/Reps Weight/ Sets/Reps Weight/ Sets/Reps   WEIGHT WELL        Sup/Pro        UD/RD        Wrist Flex/Ext        Free Weights          UBE(Minutes)          Nautilus        Compound Row        Vertical Chest        Overhead Press                    HEP: As above; handouts given to patient for all exercises. Therapeutic Modalities:      Right Wrist Heat  Type: Paraffin bath(with a supination stretch)  Duration : 15 minutes  Patient Position: Sitting                                        Joint Mobilization:        Treatment Times:  · Therapeutic Exercise: 30 minutes  · Manual Therapy: 15 minutes  · Parafin:15  minutes  · Whirlpool:  minutes  · Other:  minutes    Treatment/Session Summary:    · Response to Treatment:  Patients tolerated treatment well with no complications. Upon completion of treatment, skin condition was normal..  · Communication/Consultation:  none today  · Equipment provided today:  None today  · Recommendations/Intent for next treatment session: Next visit will focus on Increasing motion, strength and function in her right UE, and decreasing pain. .    Total Treatment Billable Duration:  60 minutes  OT Patient Time In/Time Out  Time In: 0430  Time Out: 0530  Carmen [place] OT    Future Appointments   Date Time Provider Lori Cummins   10/1/2019  4:45 PM Juhi Lepe OT [facility]   10/3/2019  4:45 PM Gilford Pate Dave Kluver, OT [facility]

## 2019-10-01 ENCOUNTER — HOSPITAL ENCOUNTER (OUTPATIENT)
Dept: PHYSICAL THERAPY | Age: 44
Discharge: HOME | End: 2019-10-01
Payer: COMMERCIAL

## 2019-10-01 PROCEDURE — 97140 MANUAL THERAPY 1/> REGIONS: CPT

## 2019-10-01 PROCEDURE — 97110 THERAPEUTIC EXERCISES: CPT

## 2019-10-01 PROCEDURE — 97018 PARAFFIN BATH THERAPY: CPT

## 2019-10-02 NOTE — PROGRESS NOTES
Bryan Ladd  : 1975  Primary: Lissa Rivers  Secondary:  [address] at [place] [id number] [facility], [address] [place] [address],[address], [place] [address].  [phone]   [phone]       OUTPATIENT OCCUPATIONAL THERAPY: Daily Treatment Note 10/1/2019  Visit Count:  6  CD-10: Treatment Diagnosis: Pain in right wrist (M25.531)Stiffness of right wrist, not elsewhere classified (M25.631)  Precautions/Allergies:   Atropine and Periactin   TREATMENT PLAN:  Effective Dates: 2019 TO 12/15/2019 (90 days). Frequency/Duration: 2 times a week for 90 Day(s  Pre-treatment Symptoms/Complaints: My wrist is stiff and sore. Pain: Initial: Pain Intensity 1: 3  Pain Location 1: Wrist  Pain Orientation 1: Right  Post Session:  2/10   Medications Last Reviewed:  10/1//2019  Updated Objective Findings:  None Today   TREATMENT:       Manual Therapy: (Soft Tissue Mobilization Duration  Duration: 15 Minutes  Duration: 15 Minutes): Technique: Connective tissue, Retrograde massage  Tissue Mobilized: Connective tissue  RUE Soft Tissue Mobilization: Yes  Technique: Retrograde massage, Connective tissue(followed by PROM)  Tissue Mobilized: Scar/adhesion   Therapeutic Exercise:                                                                               : The patient was instructed in a home exercise program.                                                Date:  10/1/19 Date:  19 Date:  19 Date:  19 Date:  19   Activity/Exercise Parameters Parameters Parameters Parameters Parameters   AROM during Fluidotherapy 20 min 15 min 20 min 20 min 20 min   Paraffin with Stretch 15 min  suo   15 min 15 min 15 min  Sup. 15 min  Sup.    Retrograde massage, Friction Scar massage, Joint Mobilization   15 min 15 min 15 min 15 min 15 min   Scarf Curl   3 min  3 min 2 min 1 min   Washer Game 3 min  2 min 3 min 3 min   Individual Gripper   20 reps   20 reps 20 reps   Hand [place]   20 reps   20 reps 20 reps   Cones Pegs          Clothes Pins 20 reps     20 reps 20 reps   A-R Bar          Exerstick 40 reps    40 reps 40 reps 40 reps   Sup/pro-Roll 3 min     3 min 3 min   AROM Ex 2 min 2 min 2 min  2 min   RESISTIVE EXERCISES Weight/ Sets/Reps   Weight/ Sets/Reps Weight/ Sets/Reps Weight/ Sets/Reps Weight/ Sets/Reps   WEIGHT WELL        Sup/Pro        UD/RD        Wrist Flex/Ext        Free Weights          UBE(Minutes)          Nautilus        Compound Row        Vertical Chest        Overhead Press                    HEP: As above; handouts given to patient for all exercises. Therapeutic Modalities:      Right Wrist Heat  Type: Paraffin bath(with a supinatiopn stretch)  Duration : 15 minutes  Patient Position: Sitting                                        Joint Mobilization:        Treatment Times:  · Therapeutic Exercise: 30 minutes  · Manual Therapy: 15 minutes  · Parafin:15  minutes  · Whirlpool:  minutes  · Other:  minutes    Treatment/Session Summary:    · Response to Treatment:  Patients tolerated treatment well with no complications. Upon completion of treatment, skin condition was normal..  · Communication/Consultation:  none today  · Equipment provided today:  None today  · Recommendations/Intent for next treatment session: Next visit will focus on Increasing motion, strength and function in her right UE, and decreasing pain. .    Total Treatment Billable Duration:  60 minutes  OT Patient Time In/Time Out  Time In: 0430  Time Out: 0530  Reyes Malling, OT    Future Appointments   Date Time Provider Lori Cummins   10/3/2019  3:30 PM Alanis Hatch OT [facility]   10/7/2019  4:45 PM BISHOP Chavez STEVEN   10/9/2019  4:45 PM Ezella Mock Saintclair Mcgregor, OT [facility]

## 2019-10-03 ENCOUNTER — HOSPITAL ENCOUNTER (OUTPATIENT)
Dept: PHYSICAL THERAPY | Age: 44
Discharge: HOME | End: 2019-10-03
Payer: COMMERCIAL

## 2019-10-03 PROCEDURE — 97140 MANUAL THERAPY 1/> REGIONS: CPT

## 2019-10-03 PROCEDURE — 97018 PARAFFIN BATH THERAPY: CPT

## 2019-10-03 PROCEDURE — 97110 THERAPEUTIC EXERCISES: CPT

## 2019-10-06 NOTE — PROGRESS NOTES
Corinne Murphy  : 1975  Primary: [facility]  Secondary:  [address] at [place] [address] [facility], [address] [place] [address],[address], [facility].  [phone]   [phone]       OUTPATIENT OCCUPATIONAL THERAPY: Daily Treatment Note 10/3/2019  Visit Count:  7  CD-10: Treatment Diagnosis: Pain in right wrist (M25.531)Stiffness of right wrist, not elsewhere classified (M25.631)  Precautions/Allergies:   Atropine and Periactin   TREATMENT PLAN:  Effective Dates: 2019 TO 12/15/2019 (90 days). Frequency/Duration: 2 times a week for 90 Day(s  Pre-treatment Symptoms/Complaints: My wrist is still not moving much. Pain: Initial: Pain Intensity 1: 3  Pain Location 1: Wrist  Pain Orientation 1: Right  Post Session:  2/10   Medications Last Reviewed:  10/1//2019  Updated Objective Findings:  Measurements taken for MD progress note. TREATMENT:       Manual Therapy: (Soft Tissue Mobilization Duration  Duration: 15 Minutes  Duration: 15 Minutes): Technique: Connective tissue, Retrograde massage  Tissue Mobilized: Connective tissue  RUE Soft Tissue Mobilization: Yes  Technique: Retrograde massage, Connective tissue(followed by PROM)  Tissue Mobilized: Scar/adhesion   Therapeutic Exercise:                                                                               : The patient was instructed in a home exercise program.                                                Date:  10/1/19 Date:  10/3/19 Date:  19 Date:  19 Date:  19   Activity/Exercise Parameters Parameters Parameters Parameters Parameters   AROM during Fluidotherapy 20 min 15 min 20 min 20 min 20 min   Paraffin with Stretch 15 min  suo   15 min  Sup. 15 min 15 min  Sup. 15 min  Sup.    Retrograde massage, Friction Scar massage, Joint Mobilization   15 min 15 min 15 min 15 min 15 min   Scarf Curl   3 min 2 min 3 min 2 min 1 min   Washer Game 3 min 3 min 2 min 3 min 3 min   Individual Gripper   20 reps 20 reps  20 reps 20 reps   Hand [place]   20 reps 20 reps  20 reps 20 reps   Cones          Pegs          Clothes Pins 20 reps   20 reps  20 reps 20 reps   A-R Bar          Exerstick 40 reps   40 reps 40 reps 40 reps 40 reps   Sup/pro-Roll 3 min   3 min  3 min 3 min   AROM Ex 2 min 2 min 2 min  2 min   RESISTIVE EXERCISES Weight/ Sets/Reps   Weight/ Sets/Reps Weight/ Sets/Reps Weight/ Sets/Reps Weight/ Sets/Reps   WEIGHT WELL        Sup/Pro        UD/RD        Wrist Flex/Ext        Free Weights          UBE(Minutes)          Nautilus        Compound Row        Vertical Chest        Overhead Press                    HEP: As above; handouts given to patient for all exercises. Therapeutic Modalities:      Right Wrist Heat  Type: Paraffin bath(with a supination stretch)  Duration : 15 minutes  Patient Position: Sitting                                        Joint Mobilization:        Treatment Times:  · Therapeutic Exercise: 30 minutes  · Manual Therapy: 15 minutes  · Parafin:15  minutes  · Whirlpool:  minutes  · Other:  minutes    Treatment/Session Summary:    · Response to Treatment:  Patients tolerated treatment well with no complications. Upon completion of treatment, skin condition was normal..  · Communication/Consultation:  none today  · Equipment provided today:  None today  · Recommendations/Intent for next treatment session: Next visit will focus on Increasing motion, strength and function in her right UE, and decreasing pain. .    Total Treatment Billable Duration:  60 minutes  OT Patient Time In/Time Out  Time In: 0330  Time Out: 0430  Jose A Lowe OT    Future Appointments   Date Time Provider Lori Cummins   10/7/2019  4:45 PM Renetta Fontenot OT [facility]   10/9/2019  4:45 PM BISHOP Espinosa E

## 2019-10-07 ENCOUNTER — HOSPITAL ENCOUNTER (OUTPATIENT)
Dept: PHYSICAL THERAPY | Age: 44
Discharge: HOME | End: 2019-10-07
Payer: COMMERCIAL

## 2019-10-07 PROCEDURE — 97140 MANUAL THERAPY 1/> REGIONS: CPT

## 2019-10-07 PROCEDURE — 97018 PARAFFIN BATH THERAPY: CPT

## 2019-10-07 PROCEDURE — 97110 THERAPEUTIC EXERCISES: CPT

## 2019-10-08 NOTE — PROGRESS NOTES
Judy Notice  : 1975  Primary: Ervin Vincent [facility]  Secondary:  [address] at [place] [id number] [facility], [address] [place] [address],[address], [place] [address].  [phone]   [phone]       OUTPATIENT OCCUPATIONAL THERAPY: Daily Treatment Note 10/7/2019  Visit Count:  8  CD-10: Treatment Diagnosis: Pain in right wrist (M25.531)Stiffness of right wrist, not elsewhere classified (M25.631)  Precautions/Allergies:   Atropine and Periactin   TREATMENT PLAN:  Effective Dates: 2019 TO 12/15/2019 (90 days). Frequency/Duration: 2 times a week for 90 Day(s  Pre-treatment Symptoms/Complaints: Dr. Nadya Siddiqui said we can begin strengthening. Pain: Initial: Pain Intensity 1: 0  Pain Location 1: Hand, Wrist  Pain Orientation 1: Right  Post Session:  2/10   Medications Last Reviewed:  10/7//2019  Updated Objective Findings:  None Today   TREATMENT:       Manual Therapy: (Soft Tissue Mobilization Duration  Duration: 15 Minutes  Duration: 15 Minutes): Technique: Connective tissue, Retrograde massage  Tissue Mobilized: Connective tissue  RUE Soft Tissue Mobilization: Yes  Technique: Retrograde massage, Connective tissue(followed by PROM)  Tissue Mobilized: Scar/adhesion   Therapeutic Exercise:                                                                               : The patient was instructed in a home exercise program.                                                Date:  10/1/19 Date:  10/3/19 Date:  10/7/19 Date:  19 Date:  19   Activity/Exercise Parameters Parameters Parameters Parameters Parameters   AROM during Fluidotherapy 20 min 15 min 20 min 20 min 20 min   Paraffin with Stretch 15 min  suo   15 min  Sup. 15 min  sup 15 min  Sup. 15 min  Sup.    Retrograde massage, Friction Scar massage, Joint Mobilization   15 min 15 min 15 min 15 min 15 min   Scarf Curl   3 min 2 min 3 min 2 min 1 min   Washer Game 3 min 3 min 2 min 3 min 3 min   Individual Gripper   20 reps 20 reps 20 reps 20 reps 20 reps   Hand [place]   20 reps 20 reps 20 reps 20 reps 20 reps   Cones          Pegs          Clothes Pins 20 reps   20 reps 20 reps 20 reps 20 reps   A-R Bar          Exerstick 40 reps   40 reps 40 reps 40 reps 40 reps   Sup/pro-Roll 3 min   3 min 3 min 3 min 3 min   AROM Ex 2 min 2 min 2 min  2 min   RESISTIVE EXERCISES Weight/ Sets/Reps   Weight/ Sets/Reps Weight/ Sets/Reps Weight/ Sets/Reps Weight/ Sets/Reps   WEIGHT WELL        Sup/Pro        UD/RD        Wrist Flex/Ext        Free Weights          UBE(Minutes)          Nautilus        Compound Row        Vertical Chest        Overhead Press                    HEP: As above; handouts given to patient for all exercises. Therapeutic Modalities:      Right Wrist Heat  Type: Paraffin bath(with a supinstion stretch)  Duration : 15 minutes  Patient Position: Sitting                                        Joint Mobilization:        Treatment Times:  · Therapeutic Exercise: 30 minutes  · Manual Therapy: 15 minutes  · Parafin:15  minutes  · Whirlpool:  minutes  · Other:  minutes    Treatment/Session Summary:    · Response to Treatment:  Patients tolerated treatment well with no complications. Upon completion of treatment, skin condition was normal..  · Communication/Consultation:  none today  · Equipment provided today:  Putty and PROM. · Recommendations/Intent for next treatment session: Next visit will focus on Increasing motion, strength and function in her right UE, and decreasing pain. .    Total Treatment Billable Duration:  60 minutes  OT Patient Time In/Time Out  Time In: 6828  Time Out: Rox 77, OT    Future Appointments   Date Time Provider Lori Cummins   10/9/2019  4:45 PM Nikky Taylor OT [facility]   10/15/2019  4:45 PM Nikky Taylor OT [facility]   10/17/2019  4:45 PM Nikky Taylor OT [facility]   10/22/2019  4:45 PM Nikky Taylor OT [facility]   10/24/2019  4:45 PM Nikky Taylor OT [facility] S[facility]   10/29/2019  4:45 PM BISHOP Huffman   10/31/2019  4:00 PM BISHOP HuffmanE

## 2019-10-09 ENCOUNTER — HOSPITAL ENCOUNTER (OUTPATIENT)
Dept: PHYSICAL THERAPY | Age: 44
Discharge: HOME | End: 2019-10-09
Payer: COMMERCIAL

## 2019-10-09 PROCEDURE — 97018 PARAFFIN BATH THERAPY: CPT

## 2019-10-09 PROCEDURE — 97110 THERAPEUTIC EXERCISES: CPT

## 2019-10-09 PROCEDURE — 97140 MANUAL THERAPY 1/> REGIONS: CPT

## 2019-10-10 NOTE — PROGRESS NOTES
Noah Hernadez  : 1975  Primary: Ruslan Odell [facility]  Secondary:  [address] at [address], [address] [place] [address],[address], [place] [address].  [phone]   [phone]       OUTPATIENT OCCUPATIONAL THERAPY: Daily Treatment Note 10/9/2019  Visit Count:  9  CD-10: Treatment Diagnosis: Pain in right wrist (M25.531)Stiffness of right wrist, not elsewhere classified (M25.631)  Precautions/Allergies:   Atropine and Periactin   TREATMENT PLAN:  Effective Dates: 2019 TO 12/15/2019 (90 days). Frequency/Duration: 2 times a week for 90 Day(s  Pre-treatment Symptoms/Complaints: My pain is a little higher from doing the new exercises. Pain: Initial: Pain Intensity 1: 6  Pain Location 1: Hand, Wrist  Pain Orientation 1: Right  Post Session:  5/10   Medications Last Reviewed:  10/7//2019  Updated Objective Findings:  None Today   TREATMENT:       Manual Therapy: (Soft Tissue Mobilization Duration  Duration: 15 Minutes  Duration: 15 Minutes): Technique: Connective tissue, Retrograde massage  Tissue Mobilized: Connective tissue  RUE Soft Tissue Mobilization: Yes  Technique: Retrograde massage, Connective tissue(followed by PROM)  Tissue Mobilized: Scar/adhesion   Therapeutic Exercise:                                                                               : The patient was instructed in a home exercise program.                                                Date:  10/1/19 Date:  10/3/19 Date:  10/7/19 Date:  10/9/19 Date:  19   Activity/Exercise Parameters Parameters Parameters Parameters Parameters   AROM during Fluidotherapy 20 min 15 min 20 min 20 min 20 min   Paraffin with Stretch 15 min  suo   15 min  Sup. 15 min  sup 15 min  Sup. 15 min  Sup.    Retrograde massage, Friction Scar massage, Joint Mobilization   15 min 15 min 15 min 15 min 15 min   Scarf Curl   3 min 2 min 3 min 2 min 1 min   Washer Game 3 min 3 min 2 min 3 min 3 min   Individual Gripper   20 reps 20 reps 20 reps 20 reps 20 reps   Hand [place]   20 reps 20 reps 20 reps 20 reps 20 reps   Cones          Putty      3 min    Clothes Pins 20 reps   20 reps 20 reps 20 reps 20 reps   A-R Bar          Exerstick 40 reps   40 reps 40 reps 40 reps 40 reps   Sup/pro-Roll 3 min   3 min 3 min 3 min 3 min   PROM Ex 2 min 2 min 2 min 5 min 2 min   RESISTIVE EXERCISES Weight/ Sets/Reps   Weight/ Sets/Reps Weight/ Sets/Reps Weight/ Sets/Reps Weight/ Sets/Reps   WEIGHT WELL        Sup/Pro        UD/RD        Wrist Flex/Ext        Free Weights          UBE(Minutes)          Nautilus        Compound Row        Vertical Chest        Overhead Press                    HEP: As above; handouts given to patient for all exercises. Therapeutic Modalities:      Right Wrist Heat  Type: Paraffin bath(with a supination stretch)  Duration : 15 minutes  Patient Position: Sitting                                        Joint Mobilization:        Treatment Times:  · Therapeutic Exercise: 30 minutes  · Manual Therapy: 15 minutes  · Parafin:15  minutes  · Whirlpool:  minutes  · Other:  minutes    Treatment/Session Summary:    · Response to Treatment:  Patients tolerated treatment well with no complications. Upon completion of treatment, skin condition was normal..  · Communication/Consultation:  none today  · Equipment provided today:  Theraband for wrist flexion& extension. · Recommendations/Intent for next treatment session: Next visit will focus on Increasing motion, strength and function in her right UE, and decreasing pain. .    Total Treatment Billable Duration:  60 minutes  OT Patient Time In/Time Out  Time In: 8108  Time Out: Rox Gonzalez, OT    Future Appointments   Date Time Provider Lori Cummins   10/15/2019  4:45 PM Lilli Vieyra OT [facility]   10/17/2019  4:45 PM Lilli Vieyra OT [facility]   10/22/2019  4:45 PM Lilli Vieyra OT [facility]   10/24/2019  4:45 PM [facility]   10/29/2019  4:45 PM BISHOP Duggan   10/31/2019  4:00 PM BISHOP Duggan

## 2019-10-15 ENCOUNTER — HOSPITAL ENCOUNTER (OUTPATIENT)
Dept: PHYSICAL THERAPY | Age: 44
Discharge: HOME | End: 2019-10-15
Payer: COMMERCIAL

## 2019-10-15 PROCEDURE — 97140 MANUAL THERAPY 1/> REGIONS: CPT

## 2019-10-15 PROCEDURE — 97018 PARAFFIN BATH THERAPY: CPT

## 2019-10-15 PROCEDURE — 97110 THERAPEUTIC EXERCISES: CPT

## 2019-10-17 ENCOUNTER — HOSPITAL ENCOUNTER (OUTPATIENT)
Dept: PHYSICAL THERAPY | Age: 44
Discharge: HOME | End: 2019-10-17
Payer: COMMERCIAL

## 2019-10-17 PROCEDURE — 97140 MANUAL THERAPY 1/> REGIONS: CPT

## 2019-10-17 PROCEDURE — 97018 PARAFFIN BATH THERAPY: CPT

## 2019-10-17 PROCEDURE — 97110 THERAPEUTIC EXERCISES: CPT

## 2019-10-19 NOTE — PROGRESS NOTES
El Lucas  : 1975  Primary: Lidia Minium [facility]  Secondary:  [address] at [place] [id number] [facility], [address] [place] [address],[address], [facility].  [phone]   [phone]       OUTPATIENT OCCUPATIONAL THERAPY: Daily Treatment Note 10/17/2019  Visit Count:  11  CD-10: Treatment Diagnosis: Pain in right wrist (M25.531)Stiffness of right wrist, not elsewhere classified (M25.631)  Precautions/Allergies:   Atropine and Periactin   TREATMENT PLAN:  Effective Dates: 2019 TO 12/15/2019 (90 days). Frequency/Duration: 2 times a week for 90 Day(s  Pre-treatment Symptoms/Complaints: My wrist is still sore. Pain: Initial: Pain Intensity 1: 4  Pain Location 1: Wrist  Pain Orientation 1: Right  Post Session:  3/10   Medications Last Reviewed:  10/17//2019  Updated Objective Findings:  None Today   TREATMENT:       Manual Therapy: (Soft Tissue Mobilization Duration  Duration: 15 Minutes  Duration: 15 Minutes): Technique: Connective tissue, Retrograde massage  Tissue Mobilized: Connective tissue  RUE Soft Tissue Mobilization: Yes  Technique: Retrograde massage, Connective tissue(followed by PROM)  Tissue Mobilized: Scar/adhesion   Therapeutic Exercise:                                                                               : The patient was instructed in a home exercise program.                                                Date:  10/17/19 Date:  10/3/19 Date:  10/7/19 Date:  10/9/19 Date:  10/15/19   Activity/Exercise Parameters Parameters Parameters Parameters Parameters   AROM during Fluidotherapy 20 min 15 min 20 min 20 min 20 min   Paraffin with Stretch 15 min  suo   15 min  Sup. 15 min  sup 15 min  Sup. 15 min  Sup.    Retrograde massage, Friction Scar massage, Joint Mobilization   15 min 15 min 15 min 15 min 15 min   Scarf Curl   3 min 2 min 3 min 2 min 1 min   Washer Game 3 min 3 min 2 min 3 min 3 min   Individual Gripper   20 reps 20 reps 20 reps 20 reps 20 reps   Hand [place] 20 reps 20 reps 20 reps 20 reps 20 reps   Cones          Putty      3 min    Clothes Pins 20 reps   20 reps 20 reps 20 reps 20 reps   A-R Bar          Exerstick 40 reps   40 reps 40 reps 40 reps 40 reps   Sup/pro-Roll 3 min   3 min 3 min 3 min 3 min   PROM Ex 2 min 2 min 2 min 5 min 2 min   RESISTIVE EXERCISES Weight/ Sets/Reps   Weight/ Sets/Reps Weight/ Sets/Reps Weight/ Sets/Reps Weight/ Sets/Reps   WEIGHT WELL        Sup/Pro        UD/RD        Wrist Flex/Ext        Free Weights 1 lb  wrist      1 lb  wrist   UBE(Minutes)          Nautilus        Compound Row        Vertical Chest        Overhead Press                    HEP: As above; handouts given to patient for all exercises. Therapeutic Modalities:      Right Wrist Heat  Type: Paraffin bath(with a supination stretch)  Duration : 15 minutes  Patient Position: Sitting                                        Joint Mobilization:        Treatment Times:  · Therapeutic Exercise: 30 minutes  · Manual Therapy: 15 minutes  · Parafin:15  minutes  · Whirlpool:  minutes  · Other:  minutes    Treatment/Session Summary:    · Response to Treatment:  Patients tolerated treatment well with no complications. Upon completion of treatment, skin condition was normal..  · Communication/Consultation:  none today  · Equipment provided today:  None today. · Recommendations/Intent for next treatment session: Next visit will focus on Increasing motion, strength and function in her right UE, and decreasing pain. .    Total Treatment Billable Duration:  60 minutes  OT Patient Time In/Time Out  Time In: 4098  Time Out: Rox Gonzalez, OT    Future Appointments   Date Time Provider Lori Cummins   10/22/2019  4:45 PM Lilli Vieyra OT [facility]   10/24/2019  4:45 PM BISHOP Shipley [facility]   10/29/2019  4:45 PM BISHOP Shipley [facility]   10/31/2019  4:00 PM BISHOP ShipleyE

## 2019-10-19 NOTE — PROGRESS NOTES
Irish Ovalle  : 1975  Primary: Manav Silva [facility]  Secondary:  [address] at [place] [address] [facility], [address] [place] [address],[address], [facility] [address].  [phone]   [phone]       OUTPATIENT OCCUPATIONAL THERAPY: Daily Treatment Note 10/15/2019  Visit Count:  10  CD-10: Treatment Diagnosis: Pain in right wrist (M25.531)Stiffness of right wrist, not elsewhere classified (M25.631)  Precautions/Allergies:   Atropine and Periactin   TREATMENT PLAN:  Effective Dates: 2019 TO 12/15/2019 (90 days). Frequency/Duration: 2 times a week for 90 Day(s  Pre-treatment Symptoms/Complaints: My wrist is . Pain: Initial: Pain Intensity 1: 4  Pain Location 1: Wrist  Pain Orientation 1: Right  Post Session:  3/10   Medications Last Reviewed:  10/7//2019  Updated Objective Findings:  None Today   TREATMENT:       Manual Therapy: (Soft Tissue Mobilization Duration  Duration: 15 Minutes  Duration: 15 Minutes): Technique: Connective tissue, Retrograde massage  Tissue Mobilized: Connective tissue  RUE Soft Tissue Mobilization: Yes  Technique: Retrograde massage, Connective tissue(followed by PROM)  Tissue Mobilized: Scar/adhesion   Therapeutic Exercise:                                                                               : The patient was instructed in a home exercise program.                                                Date:  10/1/19 Date:  10/3/19 Date:  10/7/19 Date:  10/9/19 Date:  10/15/19   Activity/Exercise Parameters Parameters Parameters Parameters Parameters   AROM during Fluidotherapy 20 min 15 min 20 min 20 min 20 min   Paraffin with Stretch 15 min  suo   15 min  Sup. 15 min  sup 15 min  Sup. 15 min  Sup.    Retrograde massage, Friction Scar massage, Joint Mobilization   15 min 15 min 15 min 15 min 15 min   Scarf Curl   3 min 2 min 3 min 2 min 1 min   Washer Game 3 min 3 min 2 min 3 min 3 min   Individual Gripper   20 reps 20 reps 20 reps 20 reps 20 reps   Hand [place] 20 reps 20 reps 20 reps 20 reps 20 reps   Cones          Putty      3 min    Clothes Pins 20 reps   20 reps 20 reps 20 reps 20 reps   A-R Bar          Exerstick 40 reps   40 reps 40 reps 40 reps 40 reps   Sup/pro-Roll 3 min   3 min 3 min 3 min 3 min   PROM Ex 2 min 2 min 2 min 5 min 2 min   RESISTIVE EXERCISES Weight/ Sets/Reps   Weight/ Sets/Reps Weight/ Sets/Reps Weight/ Sets/Reps Weight/ Sets/Reps   WEIGHT WELL        Sup/Pro        UD/RD        Wrist Flex/Ext        Free Weights       1 lb  wrist   UBE(Minutes)          Nautilus        Compound Row        Vertical Chest        Overhead Press                    HEP: As above; handouts given to patient for all exercises. Therapeutic Modalities:      Right Wrist Heat  Type: Paraffin bath(with a supination stretch)  Duration : 15 minutes  Patient Position: Sitting                                        Joint Mobilization:        Treatment Times:  · Therapeutic Exercise: 30 minutes  · Manual Therapy: 15 minutes  · Parafin:15  minutes  · Whirlpool:  minutes  · Other:  minutes    Treatment/Session Summary:    · Response to Treatment:  Patients tolerated treatment well with no complications. Upon completion of treatment, skin condition was normal..  · Communication/Consultation:  none today  · Equipment provided today:  None today. · Recommendations/Intent for next treatment session: Next visit will focus on Increasing motion, strength and function in her right UE, and decreasing pain. .    Total Treatment Billable Duration:  60 minutes  OT Patient Time In/Time Out  Time In: 4656  Time Out: Rox Gonzalez, OT    Future Appointments   Date Time Provider Lori Cummins   10/22/2019  4:45 PM Sotero Brewer OT [facility] ELIZABETH   10/24/2019  4:45 PM BISHOP Bernardo   10/29/2019  4:45 PM BISHOP BernardoORPCIRILO JOHNSON   10/31/2019  4:00 PM BISHOP BernardoEORPCIRILO FRIASE

## 2019-10-22 ENCOUNTER — HOSPITAL ENCOUNTER (OUTPATIENT)
Dept: PHYSICAL THERAPY | Age: 44
Discharge: HOME | End: 2019-10-22
Payer: COMMERCIAL

## 2019-10-22 PROCEDURE — 97018 PARAFFIN BATH THERAPY: CPT

## 2019-10-22 PROCEDURE — 97110 THERAPEUTIC EXERCISES: CPT

## 2019-10-22 PROCEDURE — 97140 MANUAL THERAPY 1/> REGIONS: CPT

## 2019-10-22 NOTE — PROGRESS NOTES
Aquiles Beal  : 1975  Primary: [facility]  Secondary:  [address] at [facility]  [address] [facility], [address] [place] [address] [address], [place] [address].  [phone]   [phone]       OUTPATIENT OCCUPATIONAL THERAPY: Daily Treatment Note 10/22/2019  Visit Count:  12  CD-10: Treatment Diagnosis: Pain in right wrist (M25.531)Stiffness of right wrist, not elsewhere classified (M25.631)  Precautions/Allergies:   Atropine and Periactin   TREATMENT PLAN:  Effective Dates: 2019 TO 12/15/2019 (90 days). Frequency/Duration: 2 times a week for 90 Day(s  Pre-treatment Symptoms/Complaints: My wrist is not as sore as it had been. Pain: Initial: Pain Intensity 1: 3  Pain Location 1: Wrist  Pain Orientation 1: Right  Post Session:  3/10   Medications Last Reviewed:  10/17//2019  Updated Objective Findings:  None Today   TREATMENT:       Manual Therapy: (Soft Tissue Mobilization Duration  Duration: 15 Minutes  Duration: 15 Minutes): Technique: Retrograde massage, Connective tissue  Tissue Mobilized: Connective tissue  RUE Soft Tissue Mobilization: Yes  Technique: Retrograde massage, Connective tissue(followed by PROM)  Tissue Mobilized: Scar/adhesion   Therapeutic Exercise:                                                                               : The patient was instructed in a home exercise program.                                                Date:  10/17/19 Date:  10/22/19 Date:  10/7/19 Date:  10/9/19 Date:  10/15/19   Activity/Exercise Parameters Parameters Parameters Parameters Parameters   AROM during Fluidotherapy 20 min 15 min 20 min 20 min 20 min   Paraffin with Stretch 15 min  suo   15 min  Sup. 15 min  sup 15 min  Sup. 15 min  Sup.    Retrograde massage, Friction Scar massage, Joint Mobilization   15 min 15 min 15 min 15 min 15 min   Scarf Curl   3 min 2 min 3 min 2 min 1 min   Washer Game 3 min 3 min 2 min 3 min 3 min   Individual Gripper   20 reps 20 reps 20 reps 20 reps 20 reps   Hand [place]   20 reps 20 reps 20 reps 20 reps 20 reps   Cones          Putty      3 min    Clothes Pins 20 reps   20 reps 20 reps 20 reps 20 reps   A-R Bar          Exerstick 40 reps   40 reps 40 reps 40 reps 40 reps   Sup/pro-Roll 3 min   3 min 3 min 3 min 3 min   PROM Ex 2 min 2 min 2 min 5 min 2 min   RESISTIVE EXERCISES Weight/ Sets/Reps   Weight/ Sets/Reps Weight/ Sets/Reps Weight/ Sets/Reps Weight/ Sets/Reps   WEIGHT WELL        Sup/Pro        UD/RD        Wrist Flex/Ext        Free Weights 1 lb  wrist   2 lbs  wrist   1 lb  wrist   UBE(Minutes)          Nautilus        Compound Row        Vertical Chest        Overhead Press                    HEP: As above; handouts given to patient for all exercises. Therapeutic Modalities:      Right Wrist Heat  Type: Paraffin bath(with a supination stretch)  Duration : 15 minutes  Patient Position: Sitting                                        Joint Mobilization:        Treatment Times:  · Therapeutic Exercise: 30 minutes  · Manual Therapy: 15 minutes  · Parafin:15  minutes  · Whirlpool:  minutes  · Other:  minutes    Treatment/Session Summary:    · Response to Treatment:  Patients tolerated treatment well with no complications. Upon completion of treatment, skin condition was normal..  · Communication/Consultation:  none today  · Equipment provided today:  None today. · Recommendations/Intent for next treatment session: Next visit will focus on Increasing motion, strength and function in her right UE, and decreasing pain. .    Total Treatment Billable Duration:  60 minutes  OT Patient Time In/Time Out  Time In: 1880  Time Out: Rox Gonzalez, OT    Future Appointments   Date Time Provider Lori Cummins   10/24/2019  4:45 PM Jez Bauer OT [facility] ELIZABETH   10/29/2019  4:45 PM BISHOP Mckee   10/31/2019  4:00 PM BISHOP Mckee

## 2019-10-24 ENCOUNTER — HOSPITAL ENCOUNTER (OUTPATIENT)
Dept: PHYSICAL THERAPY | Age: 44
Discharge: HOME | End: 2019-10-24
Payer: COMMERCIAL

## 2019-10-24 PROCEDURE — 97110 THERAPEUTIC EXERCISES: CPT

## 2019-10-24 PROCEDURE — 97018 PARAFFIN BATH THERAPY: CPT

## 2019-10-24 PROCEDURE — 97140 MANUAL THERAPY 1/> REGIONS: CPT

## 2019-10-26 NOTE — PROGRESS NOTES
Jesi Castellano  : 1975  Primary: [facility]  Secondary:  [address] [place] [address] [facility], [address] [place] [address],[address], [place] [address].  [phone]   [phone]       OUTPATIENT OCCUPATIONAL THERAPY: Daily Treatment Note 10/24/2019  Visit Count:  13  CD-10: Treatment Diagnosis: Pain in right wrist (M25.531)Stiffness of right wrist, not elsewhere classified (M25.631)  Precautions/Allergies:   Atropine and Periactin   TREATMENT PLAN:  Effective Dates: 2019 TO 12/15/2019 (90 days). Frequency/Duration: 2 times a week for 90 Day(s  Pre-treatment Symptoms/Complaints: I am able to do a little more. Pain: Initial: Pain Intensity 1: 3  Pain Location 1: Wrist  Pain Orientation 1: Right  Post Session:  3/10   Medications Last Reviewed:  10/24//2019  Updated Objective Findings:  None Today   TREATMENT:       Manual Therapy: (Soft Tissue Mobilization Duration  Duration: 15 Minutes  Duration: 15 Minutes): Technique: Connective tissue, Retrograde massage  Tissue Mobilized: Connective tissue  RUE Soft Tissue Mobilization: Yes  Technique: Retrograde massage, Connective tissue(followed by PROM)  Tissue Mobilized: Scar/adhesion   Therapeutic Exercise:                                                                               : The patient was instructed in a home exercise program.                                                Date:  10/17/19 Date:  10/22/19 Date:  10/24/19 Date:  10/9/19 Date:  10/15/19   Activity/Exercise Parameters Parameters Parameters Parameters Parameters   AROM during Fluidotherapy 20 min 15 min 20 min 20 min 20 min   Paraffin with Stretch 15 min  suo   15 min  Sup. 15 min  sup 15 min  Sup. 15 min  Sup.    Retrograde massage, Friction Scar massage, Joint Mobilization   15 min 15 min 15 min 15 min 15 min   Scarf Curl   3 min 2 min 3 min 2 min 1 min   Washer Game 3 min 3 min 2 min 3 min 3 min   Individual Gripper   20 reps 20 reps 20 reps 20 reps 20 reps Hand [place]   20 reps 20 reps 20 reps 20 reps 20 reps   Cones          Putty      3 min    Clothes Pins 20 reps   20 reps 20 reps 20 reps 20 reps   A-R Bar          Exerstick 40 reps   40 reps 40 reps 40 reps 40 reps   Sup/pro-Roll 3 min   3 min 3 min 3 min 3 min   PROM Ex 2 min 2 min 2 min 5 min 2 min   RESISTIVE EXERCISES Weight/ Sets/Reps   Weight/ Sets/Reps Weight/ Sets/Reps Weight/ Sets/Reps Weight/ Sets/Reps   WEIGHT WELL        Sup/Pro        UD/RD        Wrist Flex/Ext        Free Weights 1 lb  wrist   2 lbs  wrist 2 lbs  wrist  1 lb  wrist   UBE(Minutes)          Nautilus        Compound Row        Vertical Chest        Overhead Press                    HEP: As above; handouts given to patient for all exercises. Therapeutic Modalities:      Right Wrist Heat  Type: Paraffin bath(with a supination stretch)  Duration : 15 minutes  Patient Position: Sitting                                        Joint Mobilization:        Treatment Times:  · Therapeutic Exercise: 30 minutes  · Manual Therapy: 15 minutes  · Parafin:15  minutes  · Whirlpool:  minutes  · Other:  minutes    Treatment/Session Summary:    · Response to Treatment:  Patients tolerated treatment well with no complications. Upon completion of treatment, skin condition was normal..  · Communication/Consultation:  none today  · Equipment provided today:  None today. · Recommendations/Intent for next treatment session: Next visit will focus on Increasing motion, strength and function in her right UE, and decreasing pain. .    Total Treatment Billable Duration:  60 minutes  OT Patient Time In/Time Out  Time In: 3818  Time Out: Rox Gonzalez, OT    Future Appointments   Date Time Provider Lori Cummins   10/28/2019  4:45 PM BISHOP Chanel   10/31/2019  4:00 PM BISHOP hCanel

## 2019-10-28 ENCOUNTER — HOSPITAL ENCOUNTER (OUTPATIENT)
Dept: PHYSICAL THERAPY | Age: 44
Discharge: HOME | End: 2019-10-28
Payer: COMMERCIAL

## 2019-10-28 PROCEDURE — 97140 MANUAL THERAPY 1/> REGIONS: CPT

## 2019-10-28 PROCEDURE — 97110 THERAPEUTIC EXERCISES: CPT

## 2019-10-28 PROCEDURE — 97018 PARAFFIN BATH THERAPY: CPT

## 2019-10-28 NOTE — PROGRESS NOTES
Bryan Ladd  : 1975  Primary: Lissa Rivers  Secondary:  [address] at [place] [address] [facility], [address] [place] [address],[address], [place] [address].  [phone]   [phone]       OUTPATIENT OCCUPATIONAL THERAPY: Daily Treatment Note 10/28/2019  Visit Count:  14  CD-10: Treatment Diagnosis: Pain in right wrist (M25.531)Stiffness of right wrist, not elsewhere classified (M25.631)  Precautions/Allergies:   Atropine and Periactin   TREATMENT PLAN:  Effective Dates: 2019 TO 12/15/2019 (90 days). Frequency/Duration: 2 times a week for 90 Day(s  Pre-treatment Symptoms/Complaints: I am moving a little better. Pain: Initial: Pain Intensity 1: 3  Pain Location 1: Wrist  Pain Orientation 1: Right  Post Session:  2/10   Medications Last Reviewed:  10/24//2019  Updated Objective Findings:  None Today   TREATMENT:       Manual Therapy: (Soft Tissue Mobilization Duration  Duration: 15 Minutes  Duration: 15 Minutes): Technique: Connective tissue, Retrograde massage  Tissue Mobilized: Connective tissue  RUE Soft Tissue Mobilization: Yes  Technique: Retrograde massage, Connective tissue(followed by PROM)  Tissue Mobilized: Scar/adhesion   Therapeutic Exercise:                                                                               : The patient was instructed in a home exercise program.                                                Date:  10/17/19 Date:  10/22/19 Date:  10/24/19 Date:  10/28/19 Date:  10/15/19   Activity/Exercise Parameters Parameters Parameters Parameters Parameters   AROM during Fluidotherapy 20 min 15 min 20 min 20 min 20 min   Paraffin with Stretch 15 min  suo   15 min  Sup. 15 min  sup 15 min  Sup. 15 min  Sup.    Retrograde massage, Friction Scar massage, Joint Mobilization   15 min 15 min 15 min 15 min 15 min   Scarf Curl   3 min 2 min 3 min 2 min 1 min   Washer Game 3 min 3 min 2 min 3 min 3 min   Individual Gripper   20 reps 20 reps 20 reps 20 reps 20 reps   Hand [place]   20 reps 20 reps 20 reps 20 reps 20 reps   Cones          Putty          Clothes Pins 20 reps   20 reps 20 reps 20 reps 20 reps   A-R Bar      10 reps    Exerstick 40 reps   40 reps 40 reps 40 reps 40 reps   Sup/pro-Roll 3 min   3 min 3 min 3 min 3 min   PROM Ex 2 min 2 min 2 min 2 min 2 min   RESISTIVE EXERCISES Weight/ Sets/Reps   Weight/ Sets/Reps Weight/ Sets/Reps Weight/ Sets/Reps Weight/ Sets/Reps   WEIGHT WELL        Sup/Pro        UD/RD        Wrist Flex/Ext        Free Weights 1 lb  wrist   2 lbs  wrist 2 lbs  wrist 2 lbs  wrist 1 lb  wrist   UBE(Minutes)          Nautilus        Compound Row        Vertical Chest        Overhead Press                    HEP: As above; handouts given to patient for all exercises. Therapeutic Modalities:      Right Wrist Heat  Type: Paraffin bath(with a supination stretch)  Duration : 15 minutes  Patient Position: Sitting                                        Joint Mobilization:        Treatment Times:  · Therapeutic Exercise: 30 minutes  · Manual Therapy: 15 minutes  · Parafin:15  minutes  · Whirlpool:  minutes  · Other:  minutes    Treatment/Session Summary:    · Response to Treatment:  Patients tolerated treatment well with no complications. Upon completion of treatment, skin condition was normal..  · Communication/Consultation:  none today  · Equipment provided today:  None today. · Recommendations/Intent for next treatment session: Next visit will focus on Increasing motion, strength and function in her right UE, and decreasing pain. .    Total Treatment Billable Duration:  60 minutes  OT Patient Time In/Time Out  Time In: 1560  Time Out: Rox Gonzalez OT    Future Appointments   Date Time Provider Lori Cummins   11/6/2019 11:00 AM Sanford Granda OT [facility]   11/8/2019  3:15 PM Sanford Granda OT [facility]   11/12/2019  3:15 PM Sanford Granda OT [facility] S[facility]   11/14/2019  3:15 PM Sanford Granda OT [facility] E 11/18/2019  3:15 PM Fordyce Jesus Alberto, OT [facility] SFE   11/20/2019  3:15 PM Fordyce Jesus Alberto, OT [facility] SFE   11/26/2019  3:15 PM Fordyce Jesus Alberto, OT [facility]

## 2019-10-31 ENCOUNTER — APPOINTMENT (OUTPATIENT)
Dept: PHYSICAL THERAPY | Age: 44
End: 2019-10-31
Payer: COMMERCIAL

## 2019-11-05 ENCOUNTER — HOSPITAL ENCOUNTER (OUTPATIENT)
Dept: PHYSICAL THERAPY | Age: 44
Discharge: HOME | End: 2019-11-05
Payer: COMMERCIAL

## 2019-11-05 NOTE — PROGRESS NOTES
Irish Vasquez  : 1975  Primary: Manav Silva [facility]  Secondary:  [address] at [place] [address] [facility], [address] [place] [address],[address], [facility] [address].  [phone]   [phone]       Ms. Glyn Severe had to cancel today due to work scheduling conflict.     Stanton Gonzalez, OT

## 2019-11-06 ENCOUNTER — APPOINTMENT (OUTPATIENT)
Dept: PHYSICAL THERAPY | Age: 44
End: 2019-11-06
Payer: COMMERCIAL

## 2019-11-08 ENCOUNTER — HOSPITAL ENCOUNTER (OUTPATIENT)
Dept: PHYSICAL THERAPY | Age: 44
Discharge: HOME | End: 2019-11-08
Payer: COMMERCIAL

## 2019-11-08 PROCEDURE — 97140 MANUAL THERAPY 1/> REGIONS: CPT

## 2019-11-08 PROCEDURE — 97110 THERAPEUTIC EXERCISES: CPT

## 2019-11-08 PROCEDURE — 97018 PARAFFIN BATH THERAPY: CPT

## 2019-11-08 NOTE — PROGRESS NOTES
[place] Mina  : 1975  Primary: Ulysees [place]  Secondary:  [address] at [facility]  [address] [facility], [address] [place] [address] [address], [place] [address].  [phone]   [phone]       OUTPATIENT OCCUPATIONAL THERAPY: Daily Treatment Note 2019  Visit Count:  15  CD-10: Treatment Diagnosis: Pain in right wrist (M25.531)Stiffness of right wrist, not elsewhere classified (M25.631)  Precautions/Allergies:   Atropine and Periactin   TREATMENT PLAN:  Effective Dates: 2019 TO 12/15/2019 (90 days). Frequency/Duration: 2 times a week for 90 Day(s  Pre-treatment Symptoms/Complaints: Pt states, \" I've been really stiff and tight lately. \"  Pain: Initial: Pain Intensity 1: 3  Pain Location 1: Wrist  Pain Orientation 1: Right  Post Session:  2/10   Medications Last Reviewed:  10/24//2019  Updated Objective Findings:  None Today   TREATMENT:       Manual Therapy: ( ):     Therapeutic Exercise:                                                                               : The patient was instructed in a home exercise program.                                                Date:  10/17/19 Date:  10/22/19 Date:  10/24/19 Date:  10/28/19 Date:  10/15/19 Date:  19   Activity/Exercise Parameters Parameters Parameters Parameters Parameters    AROM during Fluidotherapy 20 min 15 min 20 min 20 min 20 min 15 minutes   Paraffin with Stretch 15 min  suo   15 min  Sup. 15 min  sup 15 min  Sup. 15 min  Sup. 15 minutes supination stretch   Retrograde massage, Friction Scar massage, Joint Mobilization   15 min 15 min 15 min 15 min 15 min 10 minutes   Scarf Curl   3 min 2 min 3 min 2 min 1 min 1 min   Washer Game 3 min 3 min 2 min 3 min 3 min 3 min   Individual Gripper   20 reps 20 reps 20 reps 20 reps 20 reps    Hand [place]   20 reps 20 reps 20 reps 20 reps 20 reps 20 reps    30 lbs   Cones           Putty           Clothes Pins 20 reps   20 reps 20 reps 20 reps 20 reps    A-R Bar      10 reps Exerstick 40 reps   40 reps 40 reps 40 reps 40 reps 40 reps horizontal    40 reps vertical   Sup/pro-Roll 3 min   3 min 3 min 3 min 3 min 3 min   PROM Ex 2 min 2 min 2 min 2 min 2 min    RESISTIVE EXERCISES Weight/ Sets/Reps   Weight/ Sets/Reps Weight/ Sets/Reps Weight/ Sets/Reps Weight/ Sets/Reps    WEIGHT WELL         Sup/Pro         UD/RD         Wrist Flex/Ext         Free Weights 1 lb  wrist   2 lbs  wrist 2 lbs  wrist 2 lbs  wrist 1 lb  wrist 1 lb  Wrist flexion x 20    2 lbs   Wrist extension  X 20     1 lbs supination/pronation x 20   UBE(Minutes)           Nautilus         Compound Row         Vertical Chest         Overhead Press                      HEP: As above; handouts given to patient for all exercises. Therapeutic Modalities:                                               Joint Mobilization:        Treatment Times:  · Therapeutic Exercise: 30 minutes  · Manual Therapy: 10 minutes  · Parafin:15  minutes  · Whirlpool:  minutes  · Other:  minutes    Treatment/Session Summary:    · Response to Treatment:  Patients tolerated treatment well with no complications. Upon completion of treatment, skin condition was normal..  · Communication/Consultation:  none today  · Equipment provided today:  None today. · Recommendations/Intent for next treatment session: Next visit will focus on Increasing motion, strength and function in her right UE, and decreasing pain. .    Total Treatment Billable Duration:  55 minutes  OT Patient Time In/Time Out  Time In: 1500  Time Out: Lori Shaffer OT    Future Appointments   Date Time Provider Lori Cummins   11/8/2019  3:15 PM Edvin Beasley OT [facility] SFE   11/12/2019  3:15 PM Melissa [place], OT [facility] SFE   11/14/2019  3:15 PM Melissa [place], OT [facility] SFE   11/18/2019  3:15 PM Melissa [place], OT [facility] SFE   11/20/2019  3:15 PM Melissa [place], OT [facility] SFE   11/26/2019  3:15 PM Melissa [place], OT [facility] SFE

## 2019-11-12 ENCOUNTER — HOSPITAL ENCOUNTER (OUTPATIENT)
Dept: PHYSICAL THERAPY | Age: 44
Discharge: HOME | End: 2019-11-12
Payer: COMMERCIAL

## 2019-11-12 PROCEDURE — 97110 THERAPEUTIC EXERCISES: CPT

## 2019-11-12 PROCEDURE — 97018 PARAFFIN BATH THERAPY: CPT

## 2019-11-12 PROCEDURE — 97140 MANUAL THERAPY 1/> REGIONS: CPT

## 2019-11-12 NOTE — PROGRESS NOTES
El Lucas  : 1975  Primary: Lidia [place]  Secondary:  [address] at [place] [id number] [facility], [address] [place] [address],[address], [place] [address].  [phone]   [phone]       OUTPATIENT OCCUPATIONAL THERAPY: Daily Treatment Note 2019  Visit Count:  16  CD-10: Treatment Diagnosis: Pain in right wrist (M25.531)Stiffness of right wrist, not elsewhere classified (M25.631)  Precautions/Allergies:   Atropine and Periactin   TREATMENT PLAN:  Effective Dates: 2019 TO 12/15/2019 (90 days). Frequency/Duration: 2 times a week for 90 Day(s  Pre-treatment Symptoms/Complaints: Pt states, \" I am not really hurting today. \"  Pain: Initial: Pain Intensity 1: 0  Post Session:  0/10   Medications Last Reviewed:  10/24//2019  Updated Objective Findings:  None Today   TREATMENT:       Manual Therapy: (Soft Tissue Mobilization Duration  Duration: 15 Minutes  Duration: 15 Minutes): Technique: Connective tissue, Retrograde massage  Tissue Mobilized: Connective tissue  RUE Soft Tissue Mobilization: Yes  Technique: Connective tissue, Retrograde massage  Tissue Mobilized: Scar/adhesion   Therapeutic Exercise:                                                                               : The patient was instructed in a home exercise program.                                                Date:  10/17/19 Date:  10/22/19 Date:  10/24/19 Date:  10/28/19 Date:  10/15/19 Date:  19 Date:  19   Activity/Exercise Parameters Parameters Parameters Parameters Parameters     AROM during Fluidotherapy 20 min 15 min 20 min 20 min 20 min 15 minutes 20 min   Paraffin with Stretch 15 min  suo   15 min  Sup. 15 min  sup 15 min  Sup. 15 min  Sup. 15 minutes supination stretch    Retrograde massage, Friction Scar massage, Joint Mobilization   15 min 15 min 15 min 15 min 15 min 10 minutes    Scarf Curl   3 min 2 min 3 min 2 min 1 min 1 min    Washer Game 3 min 3 min 2 min 3 min 3 min 3 min 5 reps Individual Gripper   20 reps 20 reps 20 reps 20 reps 20 reps     Hand [place]   20 reps 20 reps 20 reps 20 reps 20 reps 20 reps    30 lbs 20 reps    30 lbs   Cones            Putty            Clothes Pins 20 reps   20 reps 20 reps 20 reps 20 reps     A-R Bar      10 reps   15 reps   Exerstick 40 reps   40 reps 40 reps 40 reps 40 reps 40 reps horizontal    40 reps vertical 20 reps horizontal    20 reps vertical   Sup/pro-Roll 3 min   3 min 3 min 3 min 3 min 3 min    PROM Ex 2 min 2 min 2 min 2 min 2 min  10 reps wrist flexion/extension   RESISTIVE EXERCISES Weight/ Sets/Reps   Weight/ Sets/Reps Weight/ Sets/Reps Weight/ Sets/Reps Weight/ Sets/Reps     WEIGHT WELL          Sup/Pro          UD/RD          Wrist Flex/Ext          Free Weights 1 lb  wrist   2 lbs  wrist 2 lbs  wrist 2 lbs  wrist 1 lb  wrist 1 lb  Wrist flexion x 20    2 lbs   Wrist extension  X 20     1 lbs supination/pronation x 20 2 pounds  Wrist flexion and extension, radial/ulnar deviation  2 x 10 each   UBE(Minutes)            Nautilus          Compound Row          Vertical Chest          [employer]          Tennis ball toss and catch with right hand         X 20 reps     HEP: As above; handouts given to patient for all exercises. Therapeutic Modalities:      Right Wrist Heat  Type: Paraffin bath  Duration : 15 minutes  Patient Position: Sitting                                        Joint Mobilization:        Treatment Times:  · Therapeutic Exercise: 30 minutes  · Manual Therapy: 15 minutes  · Parafin:15  minutes  · Whirlpool:  minutes  · Other:  minutes    Treatment/Session Summary:    · Response to Treatment:  Patients tolerated treatment well with no complications. Upon completion of treatment, skin condition was normal. Patient demonstrating improving functional movement and coordination of the right upper extremity. · Communication/Consultation:  none today  · Equipment provided today:  None today.   · Recommendations/Intent for next treatment session: Next visit will focus on Increasing motion, strength and function in her right UE, and decreasing pain. .    Total Treatment Billable Duration:  60 minutes  OT Patient Time In/Time Out  Time In: 1912  Time Out: 1900 Leland Wesley, OT    Future Appointments   Date Time Provider Lori Cummins   11/14/2019  3:15 PM Emelia Jackson OT [facility]   11/18/2019  3:15 PM BISHOP Medina [facility]   11/20/2019  3:15 [facility]   11/26/2019  3:15 PM Emelia Jackson OT [facility]

## 2019-11-14 ENCOUNTER — HOSPITAL ENCOUNTER (OUTPATIENT)
Dept: PHYSICAL THERAPY | Age: 44
Discharge: HOME | End: 2019-11-14
Payer: COMMERCIAL

## 2019-11-14 PROCEDURE — 97140 MANUAL THERAPY 1/> REGIONS: CPT

## 2019-11-14 PROCEDURE — 97018 PARAFFIN BATH THERAPY: CPT

## 2019-11-14 PROCEDURE — 97110 THERAPEUTIC EXERCISES: CPT

## 2019-11-14 NOTE — PROGRESS NOTES
Hedda Cos  : 1975  Primary: Hermila Rivers  Secondary:  [address] at [place] [address],[address], [place] [address].  [phone]   [phone]       OUTPATIENT OCCUPATIONAL THERAPY: Daily Treatment Note 2019  Visit Count:  17  CD-10: Treatment Diagnosis: Pain in right wrist (M25.531)Stiffness of right wrist, not elsewhere classified (M25.631)  Precautions/Allergies:   Atropine and Periactin   TREATMENT PLAN:  Effective Dates: 2019 TO 12/15/2019 (90 days). Frequency/Duration: 2 times a week for 90 Day(s  Pre-treatment Symptoms/Complaints: Pt states, \" I did not have a chance to do exercises yet today. \"  Pain: Initial: Pain Intensity 1: 0  Post Session:  0/10   Medications Last Reviewed:  10/24//2019  Updated Objective Findings:  None Today   TREATMENT:       Manual Therapy: (Soft Tissue Mobilization Duration  Duration: 15 Minutes  Duration: 15 Minutes): Technique: Connective tissue, Retrograde massage  Tissue Mobilized: Connective tissue  RUE Soft Tissue Mobilization: Yes  Technique: Connective tissue, Retrograde massage  Tissue Mobilized: Scar/adhesion   Therapeutic Exercise:                                                                               : The patient was instructed in a home exercise program.                                                Date:  10/17/19 Date:  10/22/19 Date:  10/24/19 Date:  10/28/19 Date:  10/15/19 Date:  19 Date:  19 Date:  19   Activity/Exercise Parameters Parameters Parameters Parameters Parameters      AROM during Fluidotherapy 20 min 15 min 20 min 20 min 20 min 15 minutes 20 min 15 minutes   Paraffin with Stretch 15 min  suo   15 min  Sup. 15 min  sup 15 min  Sup. 15 min  Sup. 15 minutes supination stretch     Retrograde massage, Friction Scar massage, Joint Mobilization   15 min 15 min 15 min 15 min 15 min 10 minutes     Scarf Curl   3 min 2 min 3 min 2 min 1 min 1 min     [employer] 3 min 3 min 2 min 3 min 3 min 3 min 5 reps 5 reps   Individual Gripper   20 reps 20 reps 20 reps 20 reps 20 reps      Hand [place]   20 reps 20 reps 20 reps 20 reps 20 reps 20 reps    30 lbs 20 reps    30 lbs 20 reps    30 lbs   Cones             Putty             Clothes Pins 20 reps   20 reps 20 reps 20 reps 20 reps      A-R Bar      10 reps   15 reps 20 reps   Exerstick 40 reps   40 reps 40 reps 40 reps 40 reps 40 reps horizontal    40 reps vertical 20 reps horizontal    20 reps vertical 20 reps horizontal    20 reps vertical   Sup/pro-Roll 3 min   3 min 3 min 3 min 3 min 3 min     PROM Ex 2 min 2 min 2 min 2 min 2 min  10 reps wrist flexion/extension    RESISTIVE EXERCISES Weight/ Sets/Reps   Weight/ Sets/Reps Weight/ Sets/Reps Weight/ Sets/Reps Weight/ Sets/Reps      WEIGHT WELL           Sup/Pro           UD/RD           Wrist Flex/Ext           Free Weights 1 lb  wrist   2 lbs  wrist 2 lbs  wrist 2 lbs  wrist 1 lb  wrist 1 lb  Wrist flexion x 20    2 lbs   Wrist extension  X 20     1 lbs supination/pronation x 20 2 pounds  Wrist flexion and extension, radial/ulnar deviation  2 x 10 each 2 pounds  Wrist flexion and extension, radial/ulnar deviation  2 x 10 each   UBE(Minutes)             Nautilus           Compound Row           Vertical Chest           [place] [employer]           Tennis ball toss and catch with right hand         X 20 reps      HEP: As above; handouts given to patient for all exercises. Therapeutic Modalities:      Right Wrist Heat  Type: Paraffin bath  Duration : 15 minutes  Patient Position: Sitting                                        Joint Mobilization:        Treatment Times:  · Therapeutic Exercise: 30 minutes  · Manual Therapy: 15 minutes  · Parafin:15  minutes  · Whirlpool:  minutes  · Other:  minutes    Treatment/Session Summary:    · Response to Treatment:  Patients tolerated treatment well with no complications.   Upon completion of treatment, skin condition was normal. Patient demonstrating improving functional movement and coordination of the right upper extremity with slightly more stiffness today. · Communication/Consultation:  none today  · Equipment provided today:  None today. · Recommendations/Intent for next treatment session: Next visit will focus on Increasing motion, strength and function in her right UE, and decreasing pain. .    Total Treatment Billable Duration:  60 minutes  OT Patient Time In/Time Out  Time In: 3264  Time Out: Karen Brown, OT    Future Appointments   Date Time Provider Lori Cummins   11/18/2019  3:15 PM BISHOP [place]on   11/20/2019  8:00 AM Ji Bell OT [facility]   11/26/2019  3:15 PM BISHOP Weston

## 2019-11-18 ENCOUNTER — HOSPITAL ENCOUNTER (OUTPATIENT)
Dept: PHYSICAL THERAPY | Age: 44
Discharge: HOME | End: 2019-11-18
Payer: COMMERCIAL

## 2019-11-18 PROCEDURE — 97140 MANUAL THERAPY 1/> REGIONS: CPT

## 2019-11-18 PROCEDURE — 97018 PARAFFIN BATH THERAPY: CPT

## 2019-11-18 PROCEDURE — 97110 THERAPEUTIC EXERCISES: CPT

## 2019-11-18 NOTE — PROGRESS NOTES
Corinne Murphy  : 1975  Primary: Anita Milton [facility] [address] [facility]  Secondary:  [address]  at [place] [address],[address], [address] [facility]  [phone]   [phone]       OUTPATIENT OCCUPATIONAL THERAPY: Daily Treatment Note 2019  Visit Count:  18  CD-10: Treatment Diagnosis: Pain in right wrist (M25.531)Stiffness of right wrist, not elsewhere classified (M25.631)  Precautions/Allergies:   Atropine and Periactin   TREATMENT PLAN:  Effective Dates: 2019 TO 12/15/2019 (90 days). Frequency/Duration: 2 times a week for 90 Day(s  Pre-treatment Symptoms/Complaints: Pt states, \" I was a little sore over the weekend, but today is good. \"  Pain: Initial: Pain Intensity 1: 0  Post Session:  0/10   Medications Last Reviewed:  10/24//2019  Updated Objective Findings:  None Today   TREATMENT:       Manual Therapy: (Soft Tissue Mobilization Duration  Duration: 15 Minutes  Duration: 15 Minutes): Technique: Connective tissue, Retrograde massage  Tissue Mobilized: Connective tissue  RUE Soft Tissue Mobilization: Yes  Technique: Connective tissue, Retrograde massage  Tissue Mobilized: Scar/adhesion   Therapeutic Exercise:                                                                               : The patient was instructed in a home exercise program.                                                Date:  10/22/19 Date:  10/24/19 Date:  10/28/19 Date:  10/15/19 Date:  19 Date:  19 Date:  19 Date:  19   Activity/Exercise Parameters Parameters Parameters Parameters       AROM during Fluidotherapy 15 min 20 min 20 min 20 min 15 minutes 20 min 15 minutes 20 minutes   Paraffin with Stretch 15 min  Sup. 15 min  sup 15 min  Sup. 15 min  Sup. 15 minutes supination stretch      Retrograde massage, Friction Scar massage, Joint Mobilization 15 min 15 min 15 min 15 min 10 minutes      Scarf Curl 2 min 3 min 2 min 1 min 1 min      Washer Game 3 min 2 min 3 min 3 min 3 min 5 reps 5 reps 5 reps   Individual Gripper 20 reps 20 reps 20 reps 20 reps       Hand [place] 20 reps 20 reps 20 reps 20 reps 20 reps    30 lbs 20 reps    30 lbs 20 reps    30 lbs 20 reps    30 lbs   Cones           Putty           Clothes Pins 20 reps 20 reps 20 reps 20 reps       A-R Bar   10 reps   15 reps 20 reps 20 reps   Exerstick 40 reps 40 reps 40 reps 40 reps 40 reps horizontal    40 reps vertical 20 reps horizontal    20 reps vertical 20 reps horizontal    20 reps vertical 20 reps horizontal    20 reps vertical   Sup/pro-Roll 3 min 3 min 3 min 3 min 3 min      PROM Ex 2 min 2 min 2 min 2 min  10 reps wrist flexion/extension     RESISTIVE EXERCISES Weight/ Sets/Reps Weight/ Sets/Reps Weight/ Sets/Reps Weight/ Sets/Reps       WEIGHT WELL           Sup/Pro           UD/RD           Wrist Flex/Ext           Free Weights 2 lbs  wrist 2 lbs  wrist 2 lbs  wrist 1 lb  wrist 1 lb  Wrist flexion x 20    2 lbs   Wrist extension  X 20     1 lbs supination/pronation x 20 2 pounds  Wrist flexion and extension, radial/ulnar deviation  2 x 10 each 2 pounds  Wrist flexion and extension, radial/ulnar deviation  2 x 10 each 2 pounds  Wrist flexion and extension, radial/ulnar deviation  2 x 10 each   UBE(Minutes)           Nautilus           Compound Row           Vertical Chest           [place] [employer]           Tennis ball toss and catch with right hand      X 20 reps       HEP: As above; handouts given to patient for all exercises. Therapeutic Modalities:      Right Wrist Heat  Type: Paraffin bath  Duration : 15 minutes  Patient Position: Sitting                                        Joint Mobilization:        Treatment Times:  · Therapeutic Exercise: 30 minutes  · Manual Therapy: 15 minutes  · Parafin:15  minutes  · Whirlpool:  minutes  · Other:  minutes    Treatment/Session Summary:    · Response to Treatment:  Patients tolerated treatment well with no complications.   Upon completion of treatment, skin condition was normal. Patient demonstrating improving functional movement and coordination of the right upper extremity with no complaint of stiffness today. · Communication/Consultation:  none today  · Equipment provided today:  None today. · Recommendations/Intent for next treatment session: Next visit will focus on Increasing motion, strength and function in her right UE, and decreasing pain. .    Total Treatment Billable Duration:  60 minutes  OT Patient Time In/Time Out  Time In: 1540  Time Out: [address] OT    Future Appointments   Date Time Provider Lori Cummins   11/20/2019  8:00 AM Melissa Ballard OT [facility]   11/26/2019  3:15 [facility]

## 2019-11-20 ENCOUNTER — HOSPITAL ENCOUNTER (OUTPATIENT)
Dept: PHYSICAL THERAPY | Age: 44
Discharge: HOME | End: 2019-11-20
Payer: COMMERCIAL

## 2019-11-20 PROCEDURE — 97140 MANUAL THERAPY 1/> REGIONS: CPT

## 2019-11-20 PROCEDURE — 97110 THERAPEUTIC EXERCISES: CPT

## 2019-11-20 PROCEDURE — 97018 PARAFFIN BATH THERAPY: CPT

## 2019-11-20 NOTE — PROGRESS NOTES
Cathleen Snowball  : 1975  Primary: Ana Killian [facility]  Secondary:  [address] at [place] [address] [facility], [address] [place] [address],[address], [place] [address].  [phone]   [phone]       OUTPATIENT OCCUPATIONAL THERAPY: Daily Treatment Note 2019  Visit Count:  19  CD-10: Treatment Diagnosis: Pain in right wrist (M25.531)Stiffness of right wrist, not elsewhere classified (M25.631)  Precautions/Allergies:   Atropine and Periactin   TREATMENT PLAN:  Effective Dates: 2019 TO 12/15/2019 (90 days). Frequency/Duration: 2 times a week for 90 Day(s  Pre-treatment Symptoms/Complaints: Pt states, \" I am feeling good; not sore today like I was over the weekend. \"  Pain: Initial: Pain Intensity 1: 0  Post Session:  0/10   Medications Last Reviewed:  10/24//2019  Updated Objective Findings:  None Today   TREATMENT:       Manual Therapy: (Soft Tissue Mobilization Duration  Duration: 15 Minutes  Duration: 15 Minutes): Technique: Connective tissue, Retrograde massage  Tissue Mobilized: Connective tissue  RUE Soft Tissue Mobilization: Yes  Technique: Connective tissue, Retrograde massage  Tissue Mobilized: Scar/adhesion   Therapeutic Exercise:                                                                               : The patient was instructed in a home exercise program.                                                Date:  10/24/19 Date:  10/28/19 Date:  10/15/19 Date:  19 Date:  19 Date:  19 Date:  19 Date:  19   Activity/Exercise Parameters Parameters Parameters        AROM during Fluidotherapy 20 min 20 min 20 min 15 minutes 20 min 15 minutes 20 minutes 20 minutes   Paraffin with Stretch 15 min  sup 15 min  Sup. 15 min  Sup. 15 minutes supination stretch       Retrograde massage, Friction Scar massage, Joint Mobilization 15 min 15 min 15 min 10 minutes       Scarf Curl 3 min 2 min 1 min 1 min       Washer Game 2 min 3 min 3 min 3 min 5 reps 5 reps 5 reps 5 reps Individual Gripper 20 reps 20 reps 20 reps        Hand [place] 20 reps 20 reps 20 reps 20 reps    30 lbs 20 reps    30 lbs 20 reps    30 lbs 20 reps    30 lbs 20 reps    30 lbs   Cones           Putty           Clothes Pins 20 reps 20 reps 20 reps        A-R Bar  10 reps   15 reps 20 reps 20 reps 20 reps   Exerstick 40 reps 40 reps 40 reps 40 reps horizontal    40 reps vertical 20 reps horizontal    20 reps vertical 20 reps horizontal    20 reps vertical 20 reps horizontal    20 reps vertical 20 reps horizontal    20 reps vertical   Sup/pro-Roll 3 min 3 min 3 min 3 min       PROM Ex 2 min 2 min 2 min  10 reps wrist flexion/extension   5 reps wrist flexion/extension   RESISTIVE EXERCISES Weight/ Sets/Reps Weight/ Sets/Reps Weight/ Sets/Reps        WEIGHT WELL           Sup/Pro           UD/RD           Wrist Flex/Ext           Free Weights 2 lbs  wrist 2 lbs  wrist 1 lb  wrist 1 lb  Wrist flexion x 20    2 lbs   Wrist extension  X 20     1 lbs supination/pronation x 20 2 pounds  Wrist flexion and extension, radial/ulnar deviation  2 x 10 each 2 pounds  Wrist flexion and extension, radial/ulnar deviation  2 x 10 each 2 pounds  Wrist flexion and extension, radial/ulnar deviation  2 x 10 each 2 pounds  Wrist flexion and extension, radial/ulnar deviation  2 x 10 each   UBE(Minutes)           Nautilus           Compound Row           Vertical Chest           [employer]           Tennis ball toss and catch with right hand     X 20 reps   X 20 reps     HEP: As above; handouts given to patient for all exercises.     Therapeutic Modalities:      Right Wrist Heat  Type: Paraffin bath  Duration : 15 minutes  Patient Position: Sitting                                        Joint Mobilization:        Treatment Times:  · Therapeutic Exercise: 30 minutes  · Manual Therapy: 15 minutes  · Parafin:15  minutes  · Whirlpool:  minutes  · Other:  minutes    Treatment/Session Summary:    · Response to Treatment:  Patients tolerated treatment well with no complications. Upon completion of treatment, skin condition was normal. Patient demonstrating improving functional movement and coordination of the right upper extremity with no complaint of pain today. · Communication/Consultation:  none today  · Equipment provided today:  None today. · Recommendations/Intent for next treatment session: Next visit will focus on Increasing motion, strength and function in her right UE, and decreasing pain. .    Total Treatment Billable Duration:  60 minutes  OT Patient Time In/Time Out  Time In: 0041  Time Out: 1086 Lisa Bunch OT    Future Appointments   Date Time Provider Lori Cummins   11/26/2019  3:15 PM Trev Sim OT [facility]

## 2019-11-26 ENCOUNTER — HOSPITAL ENCOUNTER (OUTPATIENT)
Dept: PHYSICAL THERAPY | Age: 44
Discharge: HOME | End: 2019-11-26
Payer: COMMERCIAL

## 2019-11-26 NOTE — PROGRESS NOTES
Shannon Mendez  : 1975  Primary: Leah [facility]  Secondary:  [address] at [place] [address] [facility], [address] [place] [address],[address], [facility]. [address].  [phone]   [phone]       Ms. Luis Mcguire cancelled today's appointment due to illness.    Anton Gasca OT

## 2019-12-06 ENCOUNTER — HOSPITAL ENCOUNTER (OUTPATIENT)
Dept: PHYSICAL THERAPY | Age: 44
Discharge: HOME | End: 2019-12-06
Payer: COMMERCIAL

## 2019-12-06 PROCEDURE — 97140 MANUAL THERAPY 1/> REGIONS: CPT

## 2019-12-06 PROCEDURE — 97018 PARAFFIN BATH THERAPY: CPT

## 2019-12-06 PROCEDURE — 97110 THERAPEUTIC EXERCISES: CPT

## 2019-12-06 NOTE — PROGRESS NOTES
Caitlin Mercado  : 1975  Primary: Jannie Rivers  Secondary:  [address] at [place] [address] [facility], [address] [place] [address],[address], [facility].  [phone]   [phone]       OUTPATIENT OCCUPATIONAL THERAPY: Daily Treatment Note 2019  Visit Count:  20  CD-10: Treatment Diagnosis: Pain in right wrist (M25.531)Stiffness of right wrist, not elsewhere classified (M25.631)  Precautions/Allergies:   Atropine and Periactin   TREATMENT PLAN:  Effective Dates: 2019 TO 12/15/2019 (90 days). Frequency/Duration: 2 times a week for 90 Day(s  Pre-treatment Symptoms/Complaints: Pt states, \" I am sorry I had to miss last time; I was so sick. \"  Pain: Initial: Pain Intensity 1: 0  Post Session:  0/10   Medications Last Reviewed:  10/24//2019  Updated Objective Findings:  None Today   TREATMENT:       Manual Therapy: (Soft Tissue Mobilization Duration  Duration: 15 Minutes  Duration: 15 Minutes): Technique: Connective tissue, Retrograde massage  Tissue Mobilized: Connective tissue  RUE Soft Tissue Mobilization: Yes  Technique: Connective tissue, Retrograde massage  Tissue Mobilized: Scar/adhesion   Therapeutic Exercise:                                                                               : The patient was instructed in a home exercise program.                                                Date:  10/28/19 Date:  10/15/19 Date:  19 Date:  19 Date:  19 Date:  19 Date:  19 Date:  19   Activity/Exercise Parameters Parameters         AROM during Fluidotherapy 20 min 20 min 15 minutes 20 min 15 minutes 20 minutes 20 minutes 20 minutes   Paraffin with Stretch 15 min  Sup. 15 min  Sup. 15 minutes supination stretch        Retrograde massage, Friction Scar massage, Joint Mobilization 15 min 15 min 10 minutes        Scarf Curl 2 min 1 min 1 min        Washer Game 3 min 3 min 3 min 5 reps 5 reps 5 reps 5 reps 5 reps   Individual Gripper 20 reps 20 reps Hand [place] 20 reps 20 reps 20 reps    30 lbs 20 reps    30 lbs 20 reps    30 lbs 20 reps    30 lbs 20 reps    30 lbs 20 reps    30 lbs   Cones           Putty           Clothes Pins 20 reps 20 reps      Blue x 30   A-R Bar 10 reps   15 reps 20 reps 20 reps 20 reps 20 reps with extra hold in supination   Exerstick 40 reps 40 reps 40 reps horizontal    40 reps vertical 20 reps horizontal    20 reps vertical 20 reps horizontal    20 reps vertical 20 reps horizontal    20 reps vertical 20 reps horizontal    20 reps vertical 20 reps horizontal    20 reps vertical   Sup/pro-Roll 3 min 3 min 3 min        PROM Ex 2 min 2 min  10 reps wrist flexion/extension   5 reps wrist flexion/extension 5 reps wrist flexion/extension   RESISTIVE EXERCISES Weight/ Sets/Reps Weight/ Sets/Reps         WEIGHT WELL           Sup/Pro           UD/RD           Wrist Flex/Ext           Free Weights 2 lbs  wrist 1 lb  wrist 1 lb  Wrist flexion x 20    2 lbs   Wrist extension  X 20     1 lbs supination/pronation x 20 2 pounds  Wrist flexion and extension, radial/ulnar deviation  2 x 10 each 2 pounds  Wrist flexion and extension, radial/ulnar deviation  2 x 10 each 2 pounds  Wrist flexion and extension, radial/ulnar deviation  2 x 10 each 2 pounds  Wrist flexion and extension, radial/ulnar deviation  2 x 10 each    UBE(Minutes)           Nautilus           Compound Row           Vertical Chest           [employer]           Tennis ball toss and catch with right hand    X 20 reps   X 20 reps X 20 reps     HEP: As above; handouts given to patient for all exercises.     Therapeutic Modalities:      Right Wrist Heat  Type: Paraffin bath  Duration : 15 minutes  Patient Position: Sitting                                       With 10 pound weighted supination stretch    Joint Mobilization:        Treatment Times:  · Therapeutic Exercise: 30 minutes  · Manual Therapy: 15 minutes  · Parafin:15  minutes  · Whirlpool:  minutes  · Other: minutes    Treatment/Session Summary:    · Response to Treatment:  Patients tolerated treatment well with no complications. Upon completion of treatment, skin condition was normal. Patient demonstrating improving functional movement and coordination of the right upper extremity with no complaint of pain today. Tolerating passive stretches into wrist flexion, extension and supination. · Communication/Consultation:  none today  · Equipment provided today:  None today. · Recommendations/Intent for next treatment session: Next visit will focus on Increasing motion, strength and function in her right UE, and decreasing pain. .    Total Treatment Billable Duration:  60 minutes  OT Patient Time In/Time Out  Time In: 1520  Time Out: [address] [place] [address], OT    Future Appointments   Date Time Provider Lori Maria G   12/10/2019  8:00 AM Lawerence Sicard D, OT [facility]   12/11/2019  3:15 PM Simmie [facility], OT [facility]   12/17/2019  3:15 PM Simmie Kyle, OT [facility]   12/19/2019  8:00 AM Simmie [facility], OT [facility]   12/26/2019 10:15 AM Simmie [facility], OT [facility]   12/27/2019 11:00 AM Simmie [facility], OT [facility]   12/30/2019  8:45 AM Katlyn Lemus, OT [facility]   1/2/2020 11:00 AM Simmie [facility], OT [facility]   1/6/2020  3:15 PM Simmie [facility], OT [facility]   1/8/2020  3:15 PM Simmie [facility], OT [facility]   1/13/2020  3:15 PM Simmie [facility], OT [facility]   1/16/2020  3:15 PM Simmie Kyle, OT [facility]   1/20/2020  3:15 PM Simmie [facility], OT [facility]   1/22/2020  8:00 AM Simmie Kyle, OT [facility] SFE   1/27/2020  3:15 PM Simmie [facility], OT [facility] SFE   1/30/2020  3:15 PM BISHOP BarronELILLIE JOHNSON

## 2019-12-10 ENCOUNTER — HOSPITAL ENCOUNTER (OUTPATIENT)
Dept: PHYSICAL THERAPY | Age: 44
Discharge: HOME | End: 2019-12-10
Payer: COMMERCIAL

## 2019-12-10 PROCEDURE — 97018 PARAFFIN BATH THERAPY: CPT

## 2019-12-10 PROCEDURE — 97110 THERAPEUTIC EXERCISES: CPT

## 2019-12-10 PROCEDURE — 97140 MANUAL THERAPY 1/> REGIONS: CPT

## 2019-12-10 NOTE — PROGRESS NOTES
Mo Loredo  : 1975  Primary: Addison Rivers  Secondary:  [address] at [place] [id number] [facility], [address] [place] [address],[address] [facility], [place] [address].  [phone]   [phone]       OUTPATIENT OCCUPATIONAL THERAPY: Daily Treatment Note 12/10/2019  Visit Count:  21  CD-10: Treatment Diagnosis: Pain in right wrist (M25.531)Stiffness of right wrist, not elsewhere classified (M25.631)  Precautions/Allergies:   Atropine and Periactin   TREATMENT PLAN:  Effective Dates: 2019 TO 12/15/2019 (90 days). Frequency/Duration: 2 times a week for 90 Day(s  Pre-treatment Symptoms/Complaints: Pt states, \" I will try to push myself doing my stretches at home. \"  Pain: Initial: Pain Intensity 1: 0  Post Session:  0/10   Medications Last Reviewed:  10/24//2019  Updated Objective Findings:  None Today   TREATMENT:       Manual Therapy: (Soft Tissue Mobilization Duration  Duration: 15 Minutes  Duration: 15 Minutes): Technique: Connective tissue, Retrograde massage  Tissue Mobilized: Connective tissue  RUE Soft Tissue Mobilization: Yes  Technique: Connective tissue, Retrograde massage  Tissue Mobilized: Scar/adhesion   Therapeutic Exercise:                                                                               : The patient was instructed in a home exercise program.                                                Date:  10/15/19 Date:  19 Date:  19 Date:  19 Date:  19 Date:  19 Date:  19 Date:  12/10/19   Activity/Exercise Parameters          AROM during Fluidotherapy 20 min 15 minutes 20 min 15 minutes 20 minutes 20 minutes 20 minutes 15 minutes   Paraffin with Stretch 15 min  Sup. 15 minutes supination stretch         Retrograde massage, Friction Scar massage, Joint Mobilization 15 min 10 minutes         Scarf Curl 1 min 1 min         Washer Game 3 min 3 min 5 reps 5 reps 5 reps 5 reps 5 reps 5 reps   Individual Gripper 20 reps          Hand [place] 20 reps 20 reps    30 lbs 20 reps    30 lbs 20 reps    30 lbs 20 reps    30 lbs 20 reps    30 lbs 20 reps    30 lbs 20 reps    30 lbs   Cones           Putty           Clothes Pins 20 reps      Blue x 30    A-R Bar   15 reps 20 reps 20 reps 20 reps 20 reps with extra hold in supination 20 reps with extra hold in supination   Exerstick 40 reps 40 reps horizontal    40 reps vertical 20 reps horizontal    20 reps vertical 20 reps horizontal    20 reps vertical 20 reps horizontal    20 reps vertical 20 reps horizontal    20 reps vertical 20 reps horizontal    20 reps vertical 20 reps horizontal    20 reps vertical   Sup/pro-Roll 3 min 3 min         PROM Ex 2 min  10 reps wrist flexion/extension   5 reps wrist flexion/extension 5 reps wrist flexion/extension 5 reps wrist flexion/extension   RESISTIVE EXERCISES Weight/ Sets/Reps          WEIGHT WELL           Sup/Pro           UD/RD           Wrist Flex/Ext           Free Weights 1 lb  wrist 1 lb  Wrist flexion x 20    2 lbs   Wrist extension  X 20     1 lbs supination/pronation x 20 2 pounds  Wrist flexion and extension, radial/ulnar deviation  2 x 10 each 2 pounds  Wrist flexion and extension, radial/ulnar deviation  2 x 10 each 2 pounds  Wrist flexion and extension, radial/ulnar deviation  2 x 10 each 2 pounds  Wrist flexion and extension, radial/ulnar deviation  2 x 10 each  2 pounds  Wrist flexion and extension   UBE(Minutes)           Nautilus           Compound Row           Vertical Chest           [employer]           Tennis ball toss and catch with right hand   X 20 reps   X 20 reps X 20 reps      HEP: As above; handouts given to patient for all exercises.     Therapeutic Modalities:      Right Wrist Heat  Type: Paraffin bath  Duration : 15 minutes  Patient Position: Sitting                                       With 10 pound weighted supination stretch    Joint Mobilization:        Treatment Times:  · Therapeutic Exercise: 25 minutes  · Manual Therapy: 15 minutes  · Parafin:15  minutes  · Whirlpool:  minutes  · Other:  minutes    Treatment/Session Summary:    · Response to Treatment:  Patients tolerated treatment well with no complications. Upon completion of treatment, skin condition was normal. Patient demonstrating improving functional movement and coordination of the right upper extremity with no complaint of pain today. Tolerating passive stretches into wrist flexion, extension and supination without complaint of pain. · Communication/Consultation:  none today  · Equipment provided today:  None today. · Recommendations/Intent for next treatment session: Next visit will focus on Increasing motion, strength and function in her right UE, and decreasing pain. .    Total Treatment Billable Duration:  55 minutes  OT Patient Time In/Time Out  Time In: 1609  Time Out: [address], OT    Future Appointments   Date Time Provider Lori Cummins   12/11/2019  3:15 PM Suella Links, OT [facility]   12/17/2019  3:15 PM Suella Links, OT [facility]   12/19/2019  8:00 AM Suella Links, OT [facility]   12/26/2019 10:15 AM Suella Links, OT [facility]   12/27/2019 11:00 AM Suella Links, OT [facility]   12/30/2019  8:45 AM Zeinab Richardson, OT [facility]   1/2/2020 11:00 AM Suella Links, OT [facility]   1/6/2020  3:15 PM Suella Links, OT [facility]   1/8/2020  3:15 PM Suella Links, OT [facility]   1/13/2020  3:15 PM Suella Links, OT [facility]   1/16/2020  3:15 PM Suella Links, OT [facility]   1/20/2020  3:15 PM Suella Links, OT [facility]   1/22/2020  8:00 AM Suella Links, OT [facility] SFE   1/27/2020  3:15 PM Suella Links, OT [facility] SFE   1/30/2020  3:15 PM Suella Links, OT [facility]

## 2019-12-11 ENCOUNTER — HOSPITAL ENCOUNTER (OUTPATIENT)
Dept: PHYSICAL THERAPY | Age: 44
Discharge: HOME | End: 2019-12-11
Payer: COMMERCIAL

## 2019-12-11 PROCEDURE — 97018 PARAFFIN BATH THERAPY: CPT

## 2019-12-11 PROCEDURE — 97140 MANUAL THERAPY 1/> REGIONS: CPT

## 2019-12-11 PROCEDURE — 97110 THERAPEUTIC EXERCISES: CPT

## 2019-12-12 NOTE — PROGRESS NOTES
Grace Tinsley  : 1975  Primary: Hilary [facility]  Secondary:  [address] at [place] [id number] [facility], [address] [place] [address],[address], [address] [facility]  [phone]   [phone]       OUTPATIENT OCCUPATIONAL THERAPY: Daily Treatment Note 2019  Visit Count:  22  CD-10: Treatment Diagnosis: Pain in right wrist (M25.531)Stiffness of right wrist, not elsewhere classified (M25.631)  Precautions/Allergies:   Atropine and Periactin   TREATMENT PLAN:  Effective Dates: 2019 TO 12/15/2019 (90 days). Frequency/Duration: 2 times a week for 90 Day(s  Pre-treatment Symptoms/Complaints: Pt states, \" I am feeling good today. \"  Pain: Initial: Pain Intensity 1: 0  Post Session:  0/10   Medications Last Reviewed:  10/24//2019  Updated Objective Findings:  None Today   TREATMENT:       Manual Therapy: (Soft Tissue Mobilization Duration  Duration: 10 Minutes  Duration: 10 Minutes): Technique: Connective tissue, Retrograde massage  Tissue Mobilized: Connective tissue  RUE Soft Tissue Mobilization: Yes  Technique: Connective tissue, Retrograde massage  Tissue Mobilized: Scar/adhesion   Therapeutic Exercise:                                                                               : The patient was instructed in a home exercise program.                                                Date:  19 Date:  19 Date:  19 Date:  19 Date:  19 Date:  19 Date:  12/10/19 Date:  19   Activity/Exercise           AROM during Fluidotherapy 15 minutes 20 min 15 minutes 20 minutes 20 minutes 20 minutes 15 minutes 20 minutes   Paraffin with Stretch 15 minutes supination stretch          Retrograde massage, Friction Scar massage, Joint Mobilization 10 minutes          Scarf Curl 1 min          Washer Game 3 min 5 reps 5 reps 5 reps 5 reps 5 reps 5 reps 5 reps   Individual Gripper           Hand [place] 20 reps    30 lbs 20 reps    30 lbs 20 reps    30 lbs 20 reps    30 lbs 20 reps    30 lbs 20 reps    30 lbs 20 reps    30 lbs 20 reps    30 lbs   Cones           Putty           Clothes Pins      Blue x 30     A-R Bar  15 reps 20 reps 20 reps 20 reps 20 reps with extra hold in supination 20 reps with extra hold in supination 20 reps with extra hold in supination   Exerstick 40 reps horizontal    40 reps vertical 20 reps horizontal    20 reps vertical 20 reps horizontal    20 reps vertical 20 reps horizontal    20 reps vertical 20 reps horizontal    20 reps vertical 20 reps horizontal    20 reps vertical 20 reps horizontal    20 reps vertical 20 reps horizontal    20 reps vertical   Sup/pro-Roll 3 min          PROM Ex  10 reps wrist flexion/extension   5 reps wrist flexion/extension 5 reps wrist flexion/extension 5 reps wrist flexion/extension 5 reps wrist flexion/extension   RESISTIVE EXERCISES           WEIGHT WELL           Sup/Pro           UD/RD           Wrist Flex/Ext           Free Weights 1 lb  Wrist flexion x 20    2 lbs   Wrist extension  X 20     1 lbs supination/pronation x 20 2 pounds  Wrist flexion and extension, radial/ulnar deviation  2 x 10 each 2 pounds  Wrist flexion and extension, radial/ulnar deviation  2 x 10 each 2 pounds  Wrist flexion and extension, radial/ulnar deviation  2 x 10 each 2 pounds  Wrist flexion and extension, radial/ulnar deviation  2 x 10 each  2 pounds  Wrist flexion and extension 2 pounds  Wrist flexion and extension   UBE(Minutes)           Nautilus           Compound Row           Vertical Chest           [employer]           Tennis ball toss and catch with right hand  X 20 reps   X 20 reps X 20 reps  X 20 reps     HEP: As above; handouts given to patient for all exercises.     Therapeutic Modalities:      Right Wrist Heat  Type: Paraffin bath  Duration : 15 minutes  Patient Position: Sitting                                       With 10 pound weighted supination stretch    Joint Mobilization:        Treatment Times:  · Therapeutic Exercise: 30 minutes  · Manual Therapy: 10 minutes  · Parafin:15  minutes  · Whirlpool:  minutes  · Other:  minutes    Treatment/Session Summary:    · Response to Treatment:  Patients tolerated treatment well with no complications. Upon completion of treatment, skin condition was normal. Patient demonstrating improving functional movement and coordination of the right upper extremity with no complaint of pain today. Tolerating progressive passive stretches into wrist flexion, extension and supination without complaint of pain. · Communication/Consultation:  none today  · Equipment provided today:  None today. · Recommendations/Intent for next treatment session: Next visit will focus on Increasing motion, strength and function in her right UE, and decreasing pain. .    Total Treatment Billable Duration:  55 minutes  OT Patient Time In/Time Out  Time In: 1520  Time Out: [address] [facility]    Future Appointments   Date Time Provider Lori Cummins   12/17/2019  3:15 PM [place] Jesus Alberto, OT [facility]   12/19/2019  8:00 AM [place] Jesus Alberto, OT [facility]   12/26/2019 10:15 AM [place] Jesus Alberto, OT [facility]   12/27/2019 11:00 AM [place] Jesus Alberto, OT [facility]   12/30/2019  8:45 AM Alona David, OT [facility]   1/2/2020 11:00 AM [place] Jesus Alberto, OT [facility]FE   1/6/2020  3:15 PM [place] Jesus Alberto, OT [facility]   1/8/2020  3:15 PM [place] Jesus Alberto, OT [facility]FE   1/13/2020  3:15 PM [place] Jesus Alberto, OT [facility]FE   1/16/2020  3:15 PM [place] Jesus Alberto, OT [facility]FE   1/20/2020  3:15 PM [place] Jesus Alberto, OT [facility] SFE   1/22/2020  8:00 AM [place] Jesus Alberto, OT [facility] SFE   1/27/2020  3:15 PM [place] Jesus Alberto, OT [facility] SFE   1/30/2020  3:15 PM [place] Jesus Alberto, OT [facility] SFE

## 2019-12-17 ENCOUNTER — HOSPITAL ENCOUNTER (OUTPATIENT)
Dept: PHYSICAL THERAPY | Age: 44
Discharge: HOME | End: 2019-12-17
Payer: COMMERCIAL

## 2019-12-17 PROCEDURE — 97140 MANUAL THERAPY 1/> REGIONS: CPT

## 2019-12-17 PROCEDURE — 97018 PARAFFIN BATH THERAPY: CPT

## 2019-12-17 PROCEDURE — 97110 THERAPEUTIC EXERCISES: CPT

## 2019-12-17 NOTE — PROGRESS NOTES
Jayla Yung  : 1975  Primary: [place]  Secondary:  [address] at [place] [address],[address] [facility], [place] [address].  [phone]   [phone]       OUTPATIENT OCCUPATIONAL THERAPY: Daily Treatment Note 2019  Visit Count:  23  CD-10: Treatment Diagnosis: Pain in right wrist (M25.531)Stiffness of right wrist, not elsewhere classified (M25.631)  Precautions/Allergies:   Atropine and Periactin   TREATMENT PLAN:  Effective Dates: 2019 TO 12/15/2019 (90 days). Frequency/Duration: 2 times a week for 90 Day(s  Pre-treatment Symptoms/Complaints: Pt states, \"Do you think the cold and rainy weather can have an impact on my pain? \"  Pain: Initial: Pain Intensity 1: 2  Pain Location 1: Wrist  Pain Orientation 1: Right  Post Session:  2/10   Medications Last Reviewed:  10/24//2019  Updated Objective Findings:  See re-cert note from today   TREATMENT:       Manual Therapy: (Soft Tissue Mobilization Duration  Duration: 10 Minutes  Duration: 10 Minutes): Technique: Connective tissue, Retrograde massage  Tissue Mobilized: Connective tissue  RUE Soft Tissue Mobilization: Yes  Technique: Connective tissue, Retrograde massage  Tissue Mobilized: Scar/adhesion   Therapeutic Exercise:                                                                               : The patient was instructed in a home exercise program.                                                Date:  19 Date:  19 Date:  19 Date:  19 Date:  19 Date:  12/10/19 Date:  19 Date:  19   Activity/Exercise           AROM during Fluidotherapy 20 min 15 minutes 20 minutes 20 minutes 20 minutes 15 minutes 20 minutes 20 minutes   Paraffin with Stretch           Retrograde massage, Friction Scar massage, Joint Mobilization           Scarf Curl           Washer Game 5 reps 5 reps 5 reps 5 reps 5 reps 5 reps 5 reps 5 reps   Individual Gripper           Hand [place] 20 reps    30 lbs 20 reps    30 lbs 20 reps    30 lbs 20 reps    30 lbs 20 reps    30 lbs 20 reps    30 lbs 20 reps    30 lbs 20 reps    30 lbs   Cones           Putty           Clothes Pins     Blue x 30      A-R Bar 15 reps 20 reps 20 reps 20 reps 20 reps with extra hold in supination 20 reps with extra hold in supination 20 reps with extra hold in supination 20 reps with extra hold in supination   Exerstick 20 reps horizontal    20 reps vertical 20 reps horizontal    20 reps vertical 20 reps horizontal    20 reps vertical 20 reps horizontal    20 reps vertical 20 reps horizontal    20 reps vertical 20 reps horizontal    20 reps vertical 20 reps horizontal    20 reps vertical 20 reps horizontal    20 reps vertical   Sup/pro-Roll           PROM Ex 10 reps wrist flexion/extension   5 reps wrist flexion/extension 5 reps wrist flexion/extension 5 reps wrist flexion/extension 5 reps wrist flexion/extension 10 reps wrist flexion/extension   RESISTIVE EXERCISES           WEIGHT WELL           Sup/Pro           UD/RD           Wrist Flex/Ext           Free Weights 2 pounds  Wrist flexion and extension, radial/ulnar deviation  2 x 10 each 2 pounds  Wrist flexion and extension, radial/ulnar deviation  2 x 10 each 2 pounds  Wrist flexion and extension, radial/ulnar deviation  2 x 10 each 2 pounds  Wrist flexion and extension, radial/ulnar deviation  2 x 10 each  2 pounds  Wrist flexion and extension 2 pounds  Wrist flexion and extension 2 pounds  Wrist flexion and extension   UBE(Minutes)           Nautilus           Compound Row           Vertical Chest           [employer]           Tennis ball toss and catch with right hand X 20 reps   X 20 reps X 20 reps  X 20 reps X 20 reps     HEP: As above; handouts given to patient for all exercises.     Therapeutic Modalities:      Right Wrist Heat  Type: Paraffin bath  Duration : 15 minutes  Patient Position: Sitting                                       With 10 pound weighted supination stretch    Joint Mobilization:        Treatment Times:  · Therapeutic Exercise: 35 minutes  · Manual Therapy: 10 minutes  · Parafin:15  minutes  · Whirlpool:  minutes  · Other:  minutes    Treatment/Session Summary:    · Response to Treatment:  Patients tolerated treatment well with no complications. Upon completion of treatment, skin condition was normal. Patient demonstrating improving functional movement and coordination of the right upper extremity. Tolerating progressive passive stretches into wrist flexion, extension and supination without complaint of increased pain. Patient does report increased pain over the past few days. Communication/Consultation:  none today  · Equipment provided today:  None today. · Recommendations/Intent for next treatment session: Next visit will focus on Increasing motion, strength and function in her right UE, and decreasing pain. .    Total Treatment Billable Duration:  60 minutes  OT Patient Time In/Time Out  Time In: 6959  Time Out: 1409 [address] [place] [address],     Future Appointments   Date Time Provider Lori Maria G   12/19/2019  8:00 AM Myna Polo, OT [facility]   12/26/2019 10:15 AM Myna Polo, OT [facility]   12/27/2019 11:00 AM Myna Polo, OT [facility]   12/30/2019  8:45 AM Mo Ku, OT [facility]   1/2/2020 11:00 AM Myna Polo, OT [facility]   1/6/2020  3:15 PM Myna Polo, OT [facility]   1/8/2020  3:15 PM Myna Polo, OT [facility]   1/13/2020  3:15 PM Myna Polo, OT [facility]   1/16/2020  3:15 PM Myna Polo, OT [facility]   1/20/2020  3:15 PM Myna Polo, OT [facility]   1/22/2020  8:00 AM Myna Polo, OT [facility]   1/27/2020  3:15 PM Myna Polo, OT [facility]   1/30/2020  3:15 PM Myna Polo, OT [facility]

## 2019-12-17 NOTE — THERAPY RECERTIFICATION
Aquiles Beal  : 1975  Primary: Michell Rivers  Secondary:  [address] at [address] [facility] [place] [address] [facility], [address] [place] [address] [address], [address] [facility]  [phone]   [phone]          OUTPATIENT OCCUPATIONAL [address]    ICD-10: Treatment Diagnosis: Pain in right wrist (M25.531)Stiffness of right wrist, not elsewhere classified (M25.631)  Precautions/Allergies:   Atropine and Periactin   TREATMENT PLAN:  Effective Dates: 12/15/2019 TO 3/18/2020. Frequency/Duration: 2 times a week for 90 Day(s) MEDICAL/REFERRING DIAGNOSIS:  HAND  Right distal radius fracture  DATE OF ONSET: 2019  DATE OF SURGERY: 2019  REFERRING PHYSICIAN: Leigh Ann George MD MD Orders: Evaluate and treat  Return MD Appointment: 4 weeks   19: Ms. Osmin Fields is making good progress toward her goals with improving strength and range of motion of the right upper extremity. Feel she would benefit from skilled occupational therapy to maximize functional use of the right upper extremity in activities of daily living. INITIAL ASSESSMENT:   Ms. Osmin Fields presents with decreased functional use, strength and range of motion of her right wrist and upper extremity that is affecting her independence with activities of daily living and ability to perform job tasks. I feel that Ms. Osmin Fields will benefit from skilled occupational therapy to maximize the functional use of her wrist and upper extremity in daily activities and work tasks. PROBLEM LIST (Impacting functional limitations):  1. Pain in right wrist.  2. Decreased motion in right wrist.  3. Decreased strength in right hand. INTERVENTIONS PLANNED: (Treatment may consist of any combination of the following)  1. Modalities that may include fluidotherapy, paraffin, ultrasound, and light therapy. 2. Therapeutic exercise including a home exercise program.  3. Manual therapy. 4. Therapeutic activities.      GOALS: (Goals have been discussed and agreed upon with patient.)  Short-Term Functional Goals: Time Frame: 4 weeks  1. Decrease pain to 4 to allow patient to perform self care tasks. Met  2. Increase motion in right wrist by 20 degrees to improve functional use of upper extremity in ADL activities. Met for flexion; continue to address  3. Increase strength in right hand by 10 pounds to allow patient to  and lift objects during self care activities. Continue to address  Discharge Goals: Time Frame: 12 weeks  1. Decrease pain to 2 to allow patient to perform all household and work tasks. Met  2. Increase motion in right wrist by 40 degreees to allow patient to perform all ADL activities. Continue to address  3. Increase strength in right hand by 20 pounds to allow patient to , lift, hold, and carry heavy objects. Continue to address    OUTCOME MEASURE:   Tool Used: Disabilities of the Arm, Shoulder and Hand (DASH) Questionnaire - Quick Version  Score:  Initial: 40/55  Most Recent: X/55 (Date: -- )   Interpretation of Score: The DASH is designed to measure the activities of daily living in person's with upper extremity dysfunction or pain. Each section is scored on a 1-5 scale, 5 representing the greatest disability. The scores of each section are added together for a total score of 55. MEDICAL NECESSITY:   · Patient is expected to demonstrate progress in strength and range of motion to increase independence with ADL, household and work activities. .  REASON FOR SERVICES/OTHER COMMENTS:  · The patient has limited motion, strength and function in her right U.E..  Total Duration:  OT Patient Time In/Time Out  Time In: 1515  Time Out: 1620    Rehabilitation Potential For Stated Goals: Good  Regarding Sherrel Fiddler therapy, I certify that the treatment plan above will be carried out by a therapist or under their direction.   Thank you for this referral,  Emma Tavares, OT     Referring Physician Signature: Magalie Huerta MD _______________________________ Date _____________     PAIN/SUBJECTIVE:   Initial: Pain Intensity 1: 2  Pain Location 1: Wrist  Pain Orientation 1: Right   Post Session:  3/10   OCCUPATIONAL PROFILE & HISTORY:   History of Injury/Illness (Reason for Referral): The patient fell off her bed when she was cleaning her ceiling fan injuring her right wrist.  Past Medical History/Comorbidities:   Ms. Heidi Boudreaux  has a past medical history of Chronic pain and Migraines. Ms. Heidi Boudreaux  has a past surgical history that includes hx gyn (1994) and hx heent. Social History/Living Environment:      Prior Level of Function/Work/Activity:  independent  Dominant Side:         RIGHT   Ambulatory/Rehab Services H2 Model Falls Risk Assessment   Risk Factors:       No Risk Factors Identified Ability to Rise from Chair:       (0)  Ability to rise in a single movement   Falls Prevention Plan:       No modifications necessary   Total: (5 or greater = High Risk): 0   ©2010 [facility] [employer]. All Rights Reserved. [facility] Patent #[id number]. Federal Law prohibits the replication, distribution or use without written permission from [facility]   Current Medications:       Current Outpatient Medications:     amoxicillin-clavulanate (AUGMENTIN) 875-125 mg per tablet, Take 1 Tab by mouth every twelve (12) hours. , Disp: 20 Tab, Rfl: 0    naproxen sodium (ALEVE) 220 mg tablet, Take 440 mg by mouth two (2) times daily (with meals). , Disp: , Rfl:     spironolactone (ALDACTONE) 50 mg tablet, , Disp: , Rfl: 6    amitriptyline (ELAVIL) 50 mg tablet, Take 1 Tab by mouth nightly., Disp: 90 Tab, Rfl: 3    norgestimate-ethinyl estradiol (ORTHO TRI-CYCLEN, TRI-SPRINTEC) 0.18/0.215/0.25 mg-35 mcg (28) tab, Take 1 Tab by mouth., Disp: , Rfl:    Date Last Reviewed:  9/12/2019   Complexity Level: Brief history (0):  LOW COMPLEXITY   ASSESSMENT OF OCCUPATIONAL PERFORMANCE:   RANGE OF MOTION:     · AROM: right wrist motion is as follows: · 12/19/19: flexion=42, extension=29, supination 75% of full range, pronation full range of motion  · flexion 10, extension 15, U.D. 20, R.D. 10, supination 20, pronation 70 degrees. STRENGTH:  Not tested yet. SENSATION:  Intact           Physical Skills Involved:  1. Range of Motion  2. Strength  3. Pain (acute)  4. Edema Cognitive Skills Affected (resulting in the inability to perform in a timely and safe manner):   1. none Psychosocial Skills Affected:  1. none   Number of elements that affect the Plan of Care[de-identified] 3-5:  MODERATE COMPLEXITY   CLINICAL DECISION MAKING:   Clinical Decision-Making Assessment:     Assessment process, impact of co-morbidities, assessment modification\need for assistance, and selection of interventions: Analytical Complexity:LOW COMPLEXITY

## 2019-12-19 ENCOUNTER — APPOINTMENT (OUTPATIENT)
Dept: PHYSICAL THERAPY | Age: 44
End: 2019-12-19
Payer: COMMERCIAL

## 2019-12-19 ENCOUNTER — HOSPITAL ENCOUNTER (OUTPATIENT)
Dept: PHYSICAL THERAPY | Age: 44
Discharge: HOME | End: 2019-12-19
Payer: COMMERCIAL

## 2019-12-19 PROCEDURE — 97110 THERAPEUTIC EXERCISES: CPT

## 2019-12-19 PROCEDURE — 97018 PARAFFIN BATH THERAPY: CPT

## 2019-12-19 PROCEDURE — 97140 MANUAL THERAPY 1/> REGIONS: CPT

## 2019-12-19 NOTE — PROGRESS NOTES
Radha [place]yerville  : 1975  Primary: [place]  Secondary:  [address] at [place] [address] [facility], [address] [place] [address],[address], [address] [facility]  [phone]   [phone]       OUTPATIENT OCCUPATIONAL THERAPY: Daily Treatment Note 2019  Visit Count:  24  CD-10: Treatment Diagnosis: Pain in right wrist (M25.531)Stiffness of right wrist, not elsewhere classified (M25.631)  Precautions/Allergies:   Atropine and Periactin   TREATMENT PLAN:  Effective Dates: 2019 TO 12/15/2019 (90 days). Frequency/Duration: 2 times a week for 90 Day(s  Pre-treatment Symptoms/Complaints: Pt states, \"I am feeling better today. \"  Pain: Initial: Pain Intensity 1: 0  Post Session:  0/10   Medications Last Reviewed:  10/24//2019  Updated Objective Findings:  None Today   TREATMENT:       Manual Therapy: (Soft Tissue Mobilization Duration  Duration: 10 Minutes  Duration: 10 Minutes): Technique: Connective tissue, Retrograde massage  Tissue Mobilized: Connective tissue  RUE Soft Tissue Mobilization: Yes  Technique: Connective tissue, Retrograde massage  Tissue Mobilized: Scar/adhesion   Therapeutic Exercise:                                                                               : The patient was instructed in a home exercise program.                                                Date:  19 Date:  19 Date:  19 Date:  19 Date:  12/10/19 Date:  19 Date:  19 Date:  19   Activity/Exercise           AROM during Fluidotherapy 15 minutes 20 minutes 20 minutes 20 minutes 15 minutes 20 minutes 20 minutes 20 minutes   Paraffin with Stretch           Retrograde massage, Friction Scar massage, Joint Mobilization           Scarf Curl           Washer Game 5 reps 5 reps 5 reps 5 reps 5 reps 5 reps 5 reps 5 reps   Individual Gripper           Hand [place] 20 reps    30 lbs 20 reps    30 lbs 20 reps    30 lbs 20 reps    30 lbs 20 reps    30 lbs 20 reps    30 lbs 20 reps    30 lbs 20 reps    30 lbs   Cones           Putty           Clothes Pins    Blue x 30    Green x 30   A-R Bar 20 reps 20 reps 20 reps 20 reps with extra hold in supination 20 reps with extra hold in supination 20 reps with extra hold in supination 20 reps with extra hold in supination 20 reps with extra hold in supination   Exerstick 20 reps horizontal    20 reps vertical 20 reps horizontal    20 reps vertical 20 reps horizontal    20 reps vertical 20 reps horizontal    20 reps vertical 20 reps horizontal    20 reps vertical 20 reps horizontal    20 reps vertical 20 reps horizontal    20 reps vertical 20 reps horizontal    20 reps vertical   Sup/pro-Roll           PROM Ex   5 reps wrist flexion/extension 5 reps wrist flexion/extension 5 reps wrist flexion/extension 5 reps wrist flexion/extension 10 reps wrist flexion/extension 10 reps wrist flexion/extension   RESISTIVE EXERCISES           WEIGHT WELL           Sup/Pro           UD/RD           Wrist Flex/Ext           Free Weights 2 pounds  Wrist flexion and extension, radial/ulnar deviation  2 x 10 each 2 pounds  Wrist flexion and extension, radial/ulnar deviation  2 x 10 each 2 pounds  Wrist flexion and extension, radial/ulnar deviation  2 x 10 each  2 pounds  Wrist flexion and extension 2 pounds  Wrist flexion and extension 2 pounds  Wrist flexion and extension 2 pounds x 10 reps  1 pound x 10 reps  Wrist flexion and extension   UBE(Minutes)           Nautilus           Compound Row           Vertical Chest           [employer]           Tennis ball toss and catch with right hand   X 20 reps X 20 reps  X 20 reps X 20 reps X 20 reps     HEP: As above; handouts given to patient for all exercises.     Therapeutic Modalities:      Right Wrist Heat  Type: Paraffin bath  Duration : 15 minutes  Patient Position: Sitting                                       With 10 pound weighted supination stretch    Joint Mobilization:        Treatment Times:  · Therapeutic Exercise: 35 minutes  · Manual Therapy: 10 minutes  · Parafin:15  minutes  · Whirlpool:  minutes  · Other:  minutes    Treatment/Session Summary:    · Response to Treatment:  Patients tolerated treatment well with no complications. Upon completion of treatment, skin condition was normal. Patient demonstrating improving functional movement and coordination of the right upper extremity. Tolerating progressive passive stretches into wrist flexion, extension and supination without complaint of increased pain. Communication/Consultation:  none today  · Equipment provided today:  None today. · Recommendations/Intent for next treatment session: Next visit will focus on Increasing motion, strength and function in her right UE, and decreasing pain. .    Total Treatment Billable Duration:  60 minutes  OT Patient Time In/Time Out  Time In: 0805  Time Out: [address] [facility],     Future Appointments   Date Time Provider Lori Cummins   12/26/2019 10:15 AM Cecelia ZAIDI, OT [facility]   12/27/2019 11:00 AM Bibi [place], OT [facility]   12/30/2019  8:45 AM Julieta Herrera, OT [facility]   1/2/2020 11:00 AM Bibi Frantz, OT [facility]   1/6/2020  3:15 PM Bibi [place], OT [facility]   1/8/2020  3:15 PM Bibi Frantz, OT [facility]   1/13/2020  3:15 PM Bibi [place], OT [facility]   1/16/2020  3:15 PM Bibi [place], OT [facility]   1/20/2020  3:15 PM Bibi [place], OT [facility]   1/22/2020  8:00 AM Bibi Frantz, OT [facility]   1/27/2020  3:15 PM Bibi [place], OT [facility]   1/30/2020  3:15 PM Bibi Frantz, OT [facility]

## 2019-12-26 ENCOUNTER — HOSPITAL ENCOUNTER (OUTPATIENT)
Dept: PHYSICAL THERAPY | Age: 44
Discharge: HOME | End: 2019-12-26
Payer: COMMERCIAL

## 2019-12-26 PROCEDURE — 97018 PARAFFIN BATH THERAPY: CPT

## 2019-12-26 PROCEDURE — 97140 MANUAL THERAPY 1/> REGIONS: CPT

## 2019-12-26 PROCEDURE — 97110 THERAPEUTIC EXERCISES: CPT

## 2019-12-26 NOTE — PROGRESS NOTES
Baylor Lines  : 1975  Primary: [employer] [employer]  Secondary:  [address] at [place] [address] [facility], [address] [place] [address] [address], [address] [facility]  [phone]   [phone]       OUTPATIENT OCCUPATIONAL THERAPY: Daily Treatment Note 2019  Visit Count:  25  CD-10: Treatment Diagnosis: Pain in right wrist (M25.531)Stiffness of right wrist, not elsewhere classified (M25.631)  Precautions/Allergies:   Atropine and Periactin   TREATMENT PLAN:  Effective Dates: 2019 TO 12/15/2019 (90 days). Frequency/Duration: 2 times a week for 90 Day(s  Pre-treatment Symptoms/Complaints: Pt states, \"I keep trying to stretch, but it is harder for me to do it to myself. \"  Pain: Initial: Pain Intensity 1: 0  Post Session:  0/10   Medications Last Reviewed:  10/24//2019  Updated Objective Findings:  None Today   TREATMENT:       Manual Therapy: (Soft Tissue Mobilization Duration  Duration: 10 Minutes  Duration: 10 Minutes): Technique: Connective tissue, Retrograde massage  Tissue Mobilized: Connective tissue  RUE Soft Tissue Mobilization: Yes  Technique: Connective tissue, Retrograde massage  Tissue Mobilized: Scar/adhesion   Therapeutic Exercise:                                                                               : The patient was instructed in a home exercise program.                                                Date:  19 Date:  19 Date:  19 Date:  12/10/19 Date:  19 Date:  19 Date:  19 Date:  19   Activity/Exercise           AROM during Fluidotherapy 20 minutes 20 minutes 20 minutes 15 minutes 20 minutes 20 minutes 20 minutes 20 minutes   Paraffin with Stretch           Retrograde massage, Friction Scar massage, Joint Mobilization           Scarf Curl           Washer Game 5 reps 5 reps 5 reps 5 reps 5 reps 5 reps 5 reps 5 reps   Individual Gripper           Hand [place] 20 reps    30 lbs 20 reps    30 lbs 20 reps    30 lbs 20 reps    30 lbs 20 reps    30 lbs 20 reps    30 lbs 20 reps    30 lbs 20 reps    30 lbs   Cones           Putty           Clothes Pins   Blue x 30    Green x 30 Green x 30   A-R Bar 20 reps 20 reps 20 reps with extra hold in supination 20 reps with extra hold in supination 20 reps with extra hold in supination 20 reps with extra hold in supination 20 reps with extra hold in supination 20 reps with extra hold in supination   Exerstick 20 reps horizontal    20 reps vertical 20 reps horizontal    20 reps vertical 20 reps horizontal    20 reps vertical 20 reps horizontal    20 reps vertical 20 reps horizontal    20 reps vertical 20 reps horizontal    20 reps vertical 20 reps horizontal    20 reps vertical 20 reps horizontal    20 reps vertical   Sup/pro-Roll           PROM Ex  5 reps wrist flexion/extension 5 reps wrist flexion/extension 5 reps wrist flexion/extension 5 reps wrist flexion/extension 10 reps wrist flexion/extension 10 reps wrist flexion/extension 10 reps wrist flexion/extension   RESISTIVE EXERCISES           WEIGHT WELL           Sup/Pro           UD/RD           Wrist Flex/Ext           Free Weights 2 pounds  Wrist flexion and extension, radial/ulnar deviation  2 x 10 each 2 pounds  Wrist flexion and extension, radial/ulnar deviation  2 x 10 each  2 pounds  Wrist flexion and extension 2 pounds  Wrist flexion and extension 2 pounds  Wrist flexion and extension 2 pounds x 10 reps  1 pound x 10 reps  Wrist flexion and extension 2 pounds x 10 reps  1 pound x 10 reps  Wrist flexion and extension   UBE(Minutes)           Nautilus           Compound Row           Vertical Chest           [employer]           Tennis ball toss and catch with right hand  X 20 reps X 20 reps  X 20 reps X 20 reps X 20 reps      HEP: As above; handouts given to patient for all exercises.     Therapeutic Modalities:      Right Wrist Heat  Type: Paraffin bath  Duration : 15 minutes  Patient Position: Sitting With 10 pound weighted supination stretch    Joint Mobilization:        Treatment Times:  · Therapeutic Exercise: 35 minutes  · Manual Therapy: 10 minutes  · Parafin:15  minutes  · Whirlpool:  minutes  · Other:  minutes    Treatment/Session Summary:    · Response to Treatment:  Patients tolerated treatment well with no complications. Upon completion of treatment, skin condition was normal. Patient demonstrating improving functional movement and coordination of the right upper extremity. Tolerating progressive passive stretches into wrist flexion, extension and supination without complaint of increased pain. Continue to work on improving self range of motion in HEP. Communication/Consultation:  none today  · Equipment provided today:  None today. · Recommendations/Intent for next treatment session: Next visit will focus on Increasing motion, strength and function in her right UE, and decreasing pain. .    Total Treatment Billable Duration:  60 minutes  OT Patient Time In/Time Out  Time In: 1015  Time Out: [address], OT    Future Appointments   Date Time Provider Lori Cummins   12/27/2019 11:00 AM Nestor [place], OT [facility]   12/30/2019  8:45 AM Chasity [place], OT [facility]   1/2/2020 11:00 AM Nestor [place], OT [facility]   1/6/2020  3:15 PM Nestor [place], OT [facility]   1/8/2020  3:15 PM Nestor [place], OT [facility]   1/13/2020  3:15 PM Nestor [place], OT [facility]   1/16/2020  3:15 PM Nestor [place], OT [facility]   1/20/2020  3:15 PM Nestor [place], OT [facility]   1/22/2020  8:00 AM Nestor [place], OT [facility]   1/27/2020  3:15 PM Nestor [place], OT [facility] SFE   1/30/2020  3:15 PM Nestor [place], OT [facility] SFE

## 2019-12-27 ENCOUNTER — HOSPITAL ENCOUNTER (OUTPATIENT)
Dept: PHYSICAL THERAPY | Age: 44
Discharge: HOME | End: 2019-12-27
Payer: COMMERCIAL

## 2019-12-27 PROCEDURE — 97110 THERAPEUTIC EXERCISES: CPT

## 2019-12-27 PROCEDURE — 97140 MANUAL THERAPY 1/> REGIONS: CPT

## 2019-12-27 PROCEDURE — 97018 PARAFFIN BATH THERAPY: CPT

## 2019-12-30 ENCOUNTER — HOSPITAL ENCOUNTER (OUTPATIENT)
Dept: PHYSICAL THERAPY | Age: 44
Discharge: HOME | End: 2019-12-30
Payer: COMMERCIAL

## 2019-12-30 NOTE — PROGRESS NOTES
Elise Jin  : 1975  Primary: [facility]  Secondary:  [address]  at [place] [address], [address], [facility] [address]  [phone]   [phone]       OT NOTE:    Ms. Caroline Yousif called to cancel her appointment for today d/t having a migraine. Will follow-up at next scheduled visit.     Antione Fink, OTR/L

## 2020-01-02 ENCOUNTER — HOSPITAL ENCOUNTER (OUTPATIENT)
Dept: PHYSICAL THERAPY | Age: 45
Discharge: HOME | End: 2020-01-02
Payer: COMMERCIAL

## 2020-01-02 PROCEDURE — 97110 THERAPEUTIC EXERCISES: CPT

## 2020-01-02 PROCEDURE — 97018 PARAFFIN BATH THERAPY: CPT

## 2020-01-02 PROCEDURE — 97140 MANUAL THERAPY 1/> REGIONS: CPT

## 2020-01-02 NOTE — PROGRESS NOTES
Maria Dolores Alvarez  : 1975  Primary: [facility] [employer]  Secondary:  [address] at [address], [address] [facility]  [phone]   [phone]       OUTPATIENT OCCUPATIONAL THERAPY: Daily Treatment Note 2020  Visit Count:  27  CD-10: Treatment Diagnosis: Pain in right wrist (M25.531)Stiffness of right wrist, not elsewhere classified (M25.631)  Precautions/Allergies:   Atropine and Periactin   TREATMENT PLAN:  Effective Dates: 12/15/2019 TO 3/18/2020 (90 days). Frequency/Duration: 2 times a week for 90 Day(s  Pre-treatment Symptoms/Complaints: Pt states, \"I am not hurting now; I was a little sore on Monday, the day I had my migraine. \"  Pain: Initial: Pain Intensity 1: 0  Post Session:  0/10   Medications Last Reviewed:  10/24//2019  Updated Objective Findings:  None Today   TREATMENT:       Manual Therapy: (Soft Tissue Mobilization Duration  Duration: 10 Minutes  Duration: 10 Minutes): Technique: Connective tissue, Retrograde massage  Tissue Mobilized: Connective tissue  RUE Soft Tissue Mobilization: Yes  Technique: Connective tissue, Retrograde massage  Tissue Mobilized: Scar/adhesion   Therapeutic Exercise:                                                                               : The patient was instructed in a home exercise program.                                                Date:  19 Date:  12/10/19 Date:  19 Date:  19 Date:  19 Date:  19 Date:  19 Date:  20   Activity/Exercise           AROM during Fluidotherapy 20 minutes 15 minutes 20 minutes 20 minutes 20 minutes 20 minutes 20 minutes 15 minutes   Paraffin with Stretch           Retrograde massage, Friction Scar massage, Joint Mobilization           Scarf Curl           Was[place] Game 5 reps 5 reps 5 reps 5 reps 5 reps 5 reps     Individual Gripper           Hand [place] 20 reps    30 lbs 20 reps    30 lbs 20 reps    30 lbs 20 reps    30 lbs 20 reps    30 lbs 20 reps    30 lbs 20 reps    30 lbs 20 reps    30 lbs   Cones           Putty           Clothes Pins Blue x 30    Green x 30 Green x 30 Green x 30 Green x 30   A-R Bar 20 reps with extra hold in supination 20 reps with extra hold in supination 20 reps with extra hold in supination 20 reps with extra hold in supination 20 reps with extra hold in supination 20 reps with extra hold in supination 20 reps with extra hold in supination 20 reps with extra hold in supination   Exerstick 20 reps horizontal    20 reps vertical 20 reps horizontal    20 reps vertical 20 reps horizontal    20 reps vertical 20 reps horizontal    20 reps vertical 20 reps horizontal    20 reps vertical 20 reps horizontal    20 reps vertical 20 reps horizontal    20 reps vertical 20 reps horizontal    20 reps vertical   Sup/pro-Roll           PROM Ex 5 reps wrist flexion/extension 5 reps wrist flexion/extension 5 reps wrist flexion/extension 10 reps wrist flexion/extension 10 reps wrist flexion/extension 10 reps wrist flexion/extension 10 reps wrist flexion/extension 10 reps wrist flexion/extension   RESISTIVE EXERCISES           WEIGHT WELL           Sup/Pro           UD/RD           Wrist Flex/Ext           Free Weights  2 pounds  Wrist flexion and extension 2 pounds  Wrist flexion and extension 2 pounds  Wrist flexion and extension 2 pounds x 10 reps  1 pound x 10 reps  Wrist flexion and extension 2 pounds   2 x 10 reps    Wrist flexion and extension 2 pounds   2 x 10 reps    Wrist flexion and extension 2 pounds   2 x 10 reps    Wrist flexion and extension   UBE(Minutes)           Nautilus           Compound Row           Vertical Chest           [employer]           Tennis ball toss and catch with right hand X 20 reps  X 20 reps X 20 reps X 20 reps   X 20 reps     HEP: As above; handouts given to patient for all exercises.     Therapeutic Modalities:      Right Wrist Heat  Type: Paraffin bath  Duration : 15 minutes  Patient Position: Sitting                                       With 10 pound weighted supination stretch    Joint Mobilization:        Treatment Times:  · Therapeutic Exercise: 35 minutes  · Manual Therapy: 10 minutes  · Parafin:15  minutes  · Whirlpool:  minutes  · Other:  minutes    Treatment/Session Summary:    · Response to Treatment:  Patients tolerated treatment well with no complications. Upon completion of treatment, skin condition was normal. Patient demonstrating improving functional movement and coordination of the right upper extremity. Tolerating progressive passive stretches into wrist flexion, extension and supination without complaint of increased pain. Continue to work on improving self range of motion in HEP to patient's tolerance. Communication/Consultation:  none today  · Equipment provided today:  None today. · Recommendations/Intent for next treatment session: Next visit will focus on Increasing motion, strength and function in her right UE, and decreasing pain. .    Total Treatment Billable Duration:  60 minutes  OT Patient Time In/Time Out  Time In: 1100  Time Out: Tiffany Plummer 85, OT    Future Appointments   Date Time Provider Lori Cummins   1/6/2020  3:15 PM Tyler Proper, OT [facility]FE   1/8/2020  3:15 PM Tyler Proper, OT [facility]   1/13/2020  3:15 PM Tyler Proper, OT [facility]   1/16/2020  3:15 PM Tyler Proper, OT [facility]   1/20/2020  3:15 PM Tyler Proper, OT [facility]   1/22/2020  8:00 AM Tyler Proper, OT [facility]   1/27/2020  3:15 PM Tyler Proper, OT [facility]   1/30/2020  3:15 PM Tyler Proper, OT [facility]

## 2020-01-06 ENCOUNTER — HOSPITAL ENCOUNTER (OUTPATIENT)
Dept: PHYSICAL THERAPY | Age: 45
Discharge: HOME | End: 2020-01-06
Payer: COMMERCIAL

## 2020-01-06 PROCEDURE — 97140 MANUAL THERAPY 1/> REGIONS: CPT

## 2020-01-06 PROCEDURE — 97018 PARAFFIN BATH THERAPY: CPT

## 2020-01-06 PROCEDURE — 97110 THERAPEUTIC EXERCISES: CPT

## 2020-01-06 NOTE — PROGRESS NOTES
Madan Jaime  : 1975  Primary: Radha [facility]  Secondary:  [address] at [place] [address] [facility], [address] [place] [address],[address], [place] [address].  [phone]   [phone]       OUTPATIENT OCCUPATIONAL THERAPY: Daily Treatment Note 2020  Visit Count:  28  CD-10: Treatment Diagnosis: Pain in right wrist (M25.531)Stiffness of right wrist, not elsewhere classified (M25.631)  Precautions/Allergies:   Atropine and Periactin   TREATMENT PLAN:  Effective Dates: 12/15/2019 TO 3/18/2020 (90 days). Frequency/Duration: 2 times a week for 90 Day(s  Pre-treatment Symptoms/Complaints: Pt states, \"I was stiff this morning because of the cold. \"  Pain: Initial: Pain Intensity 1: 0  Post Session:  0/10   Medications Last Reviewed:  10/24//2019  Updated Objective Findings:  None Today   TREATMENT:       Manual Therapy: (Soft Tissue Mobilization Duration  Duration: 10 Minutes  Duration: 10 Minutes): Technique: Connective tissue, Retrograde massage  Tissue Mobilized: Connective tissue  RUE Soft Tissue Mobilization: Yes  Technique: Connective tissue, Retrograde massage  Tissue Mobilized: Scar/adhesion   Therapeutic Exercise:                                                                               : The patient was instructed in a home exercise program.                                                Date:  12/10/19 Date:  19 Date:  19 Date:  19 Date:  19 Date:  19 Date:  20 Date:  20   Activity/Exercise           AROM during Fluidotherapy 15 minutes 20 minutes 20 minutes 20 minutes 20 minutes 20 minutes 15 minutes 15 minutes   Paraffin with Stretch           Retrograde massage, Friction Scar massage, Joint Mobilization           Scarf Curl           Washer Game 5 reps 5 reps 5 reps 5 reps 5 reps      Individual Gripper           Hand [place] 20 reps    30 lbs 20 reps    30 lbs 20 reps    30 lbs 20 reps    30 lbs 20 reps    30 lbs 20 reps    30 lbs 20 reps    30 lbs 20 reps    30 lbs   Cones           Putty           Clothes Pins    Green x 30 Green x 30 Green x 30 Green x 30 Green x 30   A-R Bar 20 reps with extra hold in supination 20 reps with extra hold in supination 20 reps with extra hold in supination 20 reps with extra hold in supination 20 reps with extra hold in supination 20 reps with extra hold in supination 20 reps with extra hold in supination 20 reps with extra hold in supination   Exerstick 20 reps horizontal    20 reps vertical 20 reps horizontal    20 reps vertical 20 reps horizontal    20 reps vertical 20 reps horizontal    20 reps vertical 20 reps horizontal    20 reps vertical 20 reps horizontal    20 reps vertical 20 reps horizontal    20 reps vertical 20 reps horizontal    20 reps vertical   Sup/pro-Roll           PROM Ex 5 reps wrist flexion/extension 5 reps wrist flexion/extension 10 reps wrist flexion/extension 10 reps wrist flexion/extension 10 reps wrist flexion/extension 10 reps wrist flexion/extension 10 reps wrist flexion/extension 10 reps wrist flexion/extension   RESISTIVE EXERCISES           WEIGHT WELL           Sup/Pro           UD/RD           Wrist Flex/Ext           Free Weights 2 pounds  Wrist flexion and extension 2 pounds  Wrist flexion and extension 2 pounds  Wrist flexion and extension 2 pounds x 10 reps  1 pound x 10 reps  Wrist flexion and extension 2 pounds   2 x 10 reps    Wrist flexion and extension 2 pounds   2 x 10 reps    Wrist flexion and extension 2 pounds   2 x 10 reps    Wrist flexion and extension 2 pounds   2 x 10 reps    Wrist flexion and extension   UBE(Minutes)           Nautilus           Compound Row           Vertical Chest           [employer]           Tennis ball toss and catch with right hand  X 20 reps X 20 reps X 20 reps   X 20 reps X 20 reps     HEP: As above; handouts given to patient for all exercises.     Therapeutic Modalities:      Right Wrist Heat  Type: Paraffin bath  Duration : 15 minutes  Patient Position: Sitting                                       With 10 pound weighted supination stretch    Joint Mobilization:        Treatment Times:  · Therapeutic Exercise: 35 minutes  · Manual Therapy: 10 minutes  · Parafin:15  minutes  · Whirlpool:  minutes  · Other:  minutes    Treatment/Session Summary:    · Response to Treatment:  Patients tolerated treatment well with no complications. Upon completion of treatment, skin condition was normal. Patient demonstrating improving functional movement and coordination of the right upper extremity. Tolerating progressive passive stretches into wrist flexion, extension and supination without complaint of increased pain. Continue to work on improving self range of motion in HEP to patient's tolerance. Noted improving functional supination today. Communication/Consultation:  none today  · Equipment provided today:  None today. · Recommendations/Intent for next treatment session: Next visit will focus on Increasing motion, strength and function in her right UE, and decreasing pain. .    Total Treatment Billable Duration:  60 minutes  OT Patient Time In/Time Out  Time In: 1667  Time Out: 15 [facility],     Future Appointments   Date Time Provider Lori Cummins   1/8/2020  3:15 PM Tyler Proper, OT [facility]   1/13/2020  3:15 PM Tyler Proper, OT [facility]   1/16/2020  3:15 PM Tyler Proper, OT [facility]   1/20/2020  3:15 PM Tyler Proper, OT [facility]   1/22/2020  8:00 AM Tyler Proper, OT [facility]   1/27/2020  3:15 PM Tyler Proper, OT [facility]   1/30/2020  3:15 PM Tyler Proper, OT [facility]

## 2020-01-08 ENCOUNTER — HOSPITAL ENCOUNTER (OUTPATIENT)
Dept: PHYSICAL THERAPY | Age: 45
Discharge: HOME | End: 2020-01-08
Payer: COMMERCIAL

## 2020-01-08 PROCEDURE — 97018 PARAFFIN BATH THERAPY: CPT

## 2020-01-08 PROCEDURE — 97140 MANUAL THERAPY 1/> REGIONS: CPT

## 2020-01-08 PROCEDURE — 97110 THERAPEUTIC EXERCISES: CPT

## 2020-01-09 NOTE — PROGRESS NOTES
Emmanuel Ivory  : 1975  Primary: [employer]  Secondary:  [address] [facility]  [address], [address], [address] [facility]  [phone]   [phone]       OUTPATIENT OCCUPATIONAL THERAPY: Daily Treatment Note 2020  Visit Count:  29  CD-10: Treatment Diagnosis: Pain in right wrist (M25.531)Stiffness of right wrist, not elsewhere classified (M25.631)  Precautions/Allergies:   Atropine and Periactin   TREATMENT PLAN:  Effective Dates: 12/15/2019 TO 3/18/2020 (90 days). Frequency/Duration: 2 times a week for 90 Day(s  Pre-treatment Symptoms/Complaints: Pt states, \"I was stiff this morning because of the cold. \"  Pain: Initial: Pain Intensity 1: 3  Pain Location 1: Wrist  Pain Orientation 1: Right  Post Session:  0/10   Medications Last Reviewed:  10/24//2019  Updated Objective Findings:  None Today   TREATMENT:       Manual Therapy: (Soft Tissue Mobilization Duration  Duration: 15 Minutes  Duration: 15 Minutes): Technique: Connective tissue, Retrograde massage  RUE Soft Tissue Mobilization: Yes  Technique: Retrograde massage, Connective tissue(followed by PROM)  Tissue Mobilized: Scar/adhesion   Therapeutic Exercise:                                                                               : The patient was instructed in a home exercise program.                                                Date:  20 Date:  19 Date:  19 Date:  19 Date:  19 Date:  19 Date:  20 Date:  20   Activity/Exercise           AROM during Fluidotherapy 15 minutes 20 minutes 20 minutes 20 minutes 20 minutes 20 minutes 15 minutes 15 minutes   Paraffin with Stretch 15 min  supination          Retrograde massage, Friction Scar massage, Joint Mobilization 15 min          Scarf Curl           Washer Game 5 reps 5 reps 5 reps 5 reps 5 reps      Individual Gripper 25 reps          Hand [place] 20 reps    30 lbs 20 reps    30 lbs 20 reps    30 lbs 20 reps    30 lbs 20 reps    30 lbs 20 reps    30 lbs 20 reps    30 lbs 20 reps    30 lbs   Cones           Putty           Clothes Pins    Green x 30 Green x 30 Green x 30 Green x 30 Green x 30   A-R Bar 20 reps with extra hold in supination 20 reps with extra hold in supination 20 reps with extra hold in supination 20 reps with extra hold in supination 20 reps with extra hold in supination 20 reps with extra hold in supination 20 reps with extra hold in supination 20 reps with extra hold in supination   Exerstick 20 reps horizontal    20 reps vertical 20 reps horizontal    20 reps vertical 20 reps horizontal    20 reps vertical 20 reps horizontal    20 reps vertical 20 reps horizontal    20 reps vertical 20 reps horizontal    20 reps vertical 20 reps horizontal    20 reps vertical 20 reps horizontal    20 reps vertical   Sup/pro-Roll           PROM Ex 5 reps wrist flexion/extension 5 reps wrist flexion/extension 10 reps wrist flexion/extension 10 reps wrist flexion/extension 10 reps wrist flexion/extension 10 reps wrist flexion/extension 10 reps wrist flexion/extension 10 reps wrist flexion/extension   RESISTIVE EXERCISES           WEIGHT WELL           Sup/Pro           UD/RD           Wrist Flex/Ext           Free Weights 2 pounds  Wrist flexion and extension 2 pounds  Wrist flexion and extension 2 pounds  Wrist flexion and extension 2 pounds x 10 reps  1 pound x 10 reps  Wrist flexion and extension 2 pounds   2 x 10 reps    Wrist flexion and extension 2 pounds   2 x 10 reps    Wrist flexion and extension 2 pounds   2 x 10 reps    Wrist flexion and extension 2 pounds   2 x 10 reps    Wrist flexion and extension   UBE(Minutes)           Nautilus           Compound Row           Vertical Chest           [employer]           Tennis ball toss and catch with right hand X 20 reps X 20 reps X 20 reps X 20 reps   X 20 reps X 20 reps     HEP: As above; handouts given to patient for all exercises.     Therapeutic Modalities:      Right Wrist Heat  Type: Paraffin bath(with a supination stretch)  Duration : 15 minutes  Patient Position: Sitting                                       With 10 pound weighted supination stretch    Joint Mobilization:        Treatment Times:  · Therapeutic Exercise: 30 minutes  · Manual Therapy: 15 minutes  · Parafin:15  minutes  · Whirlpool:  minutes  · Other:  minutes    Treatment/Session Summary:    · Response to Treatment:  Patients tolerated treatment well with no complications. Upon completion of treatment, skin condition was normal. Patient demonstrating improving functional movement and coordination of the right upper extremity. Tolerating progressive passive stretches into wrist flexion, extension and supination without complaint of increased pain. Continue to work on improving self range of motion in HEP to patient's tolerance. Noted improving functional supination today. Communication/Consultation:  none today  · Equipment provided today:  None today. · Recommendations/Intent for next treatment session: Next visit will focus on Increasing motion, strength and function in her right UE, and decreasing pain. .    Total Treatment Billable Duration:  60 minutes  OT Patient Time In/Time Out  Time In: 0330  Time Out: 0430  Joseph Sanchez OT    Future Appointments   Date Time Provider Lori Cummins   1/13/2020  4:30 PM Critsina Dumont OT [facility]   1/16/2020  3:15 PM Anupama Stout OT [facility]   1/20/2020  3:15 PM Anupama Stout OT [facility]   1/22/2020  8:00 AM Anupama Stout OT [facility]   1/27/2020  3:15 PM Anupama Stout OT [facility] SFE   1/30/2020  3:15 PM Anupama Stout OT [facility]

## 2020-01-13 ENCOUNTER — HOSPITAL ENCOUNTER (OUTPATIENT)
Dept: PHYSICAL THERAPY | Age: 45
Discharge: HOME | End: 2020-01-13
Payer: COMMERCIAL

## 2020-01-13 PROCEDURE — 97110 THERAPEUTIC EXERCISES: CPT

## 2020-01-13 PROCEDURE — 97140 MANUAL THERAPY 1/> REGIONS: CPT

## 2020-01-13 PROCEDURE — 97018 PARAFFIN BATH THERAPY: CPT

## 2020-01-13 NOTE — PROGRESS NOTES
Stephanie Machelle  : 1975  Primary: [facility]  Secondary:  [address], [address] [place] [address],[address], [address] [facility]  [phone]   [phone]       OUTPATIENT OCCUPATIONAL THERAPY: Daily Treatment Note 2020  Visit Count:  30  CD-10: Treatment Diagnosis: Pain in right wrist (M25.531)Stiffness of right wrist, not elsewhere classified (M25.631)  Precautions/Allergies:   Atropine and Periactin   TREATMENT PLAN:  Effective Dates: 12/15/2019 TO 3/18/2020 (90 days). Frequency/Duration: 2 times a week for 90 Day(s  Pre-treatment Symptoms/Complaints: Pt states, \"I am doing pretty well today. \"  Pain: Initial: Pain Intensity 1: 2  Pain Location 1: Wrist  Pain Orientation 1: Right  Post Session:  2/10   Medications Last Reviewed:  10/24//2019  Updated Objective Findings:  None Today   TREATMENT:       Manual Therapy: (Soft Tissue Mobilization Duration  Duration: 15 Minutes  Duration: 15 Minutes): Technique: Retrograde massage  Tissue Mobilized: Connective tissue  RUE Soft Tissue Mobilization: Yes  Tissue Mobilized: Scar/adhesion   Therapeutic Exercise:                                                                               : The patient was instructed in a home exercise program.                                                Date:  20 Date:  20 Date:  19 Date:  19 Date:  19 Date:  19 Date:  20 Date:  20   Activity/Exercise           AROM during Fluidotherapy 15 minutes 20 minutes 20 minutes 20 minutes 20 minutes 20 minutes 15 minutes 15 minutes   Paraffin with Stretch 15 min  supination 15 min  supination         Retrograde massage, Friction Scar massage, Joint Mobilization 15 min 15 min         Scarf Curl           Washer Game 5 reps 5 reps 5 reps 5 reps 5 reps      Individual Gripper 25 reps 30 reps         Hand [place] 20 reps    30 lbs 20 reps    30 lbs 20 reps    30 lbs 20 reps    30 lbs 20 reps    30 lbs 20 reps    30 lbs 20 reps    30 lbs 20 reps    30 lbs   Cones           Putty           Clothes Pins    Green x 30 Green x 30 Green x 30 Green x 30 Green x 30   A-R Bar 20 reps with extra hold in supination 20 reps with extra hold in supination 20 reps with extra hold in supination 20 reps with extra hold in supination 20 reps with extra hold in supination 20 reps with extra hold in supination 20 reps with extra hold in supination 20 reps with extra hold in supination   Exerstick 20 reps horizontal    20 reps vertical 20 reps horizontal    20 reps vertical 20 reps horizontal    20 reps vertical 20 reps horizontal    20 reps vertical 20 reps horizontal    20 reps vertical 20 reps horizontal    20 reps vertical 20 reps horizontal    20 reps vertical 20 reps horizontal    20 reps vertical   Sup/pro-Roll           PROM Ex 5 reps wrist flexion/extension 5 reps wrist flexion/extension 10 reps wrist flexion/extension 10 reps wrist flexion/extension 10 reps wrist flexion/extension 10 reps wrist flexion/extension 10 reps wrist flexion/extension 10 reps wrist flexion/extension   RESISTIVE EXERCISES           WEIGHT WELL           Sup/Pro           UD/RD           Wrist Flex/Ext           Free Weights 2 pounds  Wrist flexion and extension 2 & 3 pounds  Wrist flexion and extension 2 pounds  Wrist flexion and extension 2 pounds x 10 reps  1 pound x 10 reps  Wrist flexion and extension 2 pounds   2 x 10 reps    Wrist flexion and extension 2 pounds   2 x 10 reps    Wrist flexion and extension 2 pounds   2 x 10 reps    Wrist flexion and extension 2 pounds   2 x 10 reps    Wrist flexion and extension   UBE(Minutes)           Nautilus           Compound Row           Vertical Chest           [employer] [employer]           Tennis ball toss and catch with right hand X 20 reps X 20 reps X 20 reps X 20 reps   X 20 reps X 20 reps     HEP: As above; handouts given to patient for all exercises.     Therapeutic Modalities:      Right Wrist Heat  Type: Paraffin bath(with a supination stretch)  Duration : 15 minutes  Patient Position: Sitting                                       With 10 pound weighted supination stretch    Joint Mobilization:        Treatment Times:  · Therapeutic Exercise: 30 minutes  · Manual Therapy: 15 minutes  · Parafin:15  minutes  · Whirlpool:  minutes  · Other:  minutes    Treatment/Session Summary:    · Response to Treatment:  Patients tolerated treatment well with no complications. Upon completion of treatment, skin condition was normal. Patient demonstrating improving functional movement and coordination of the right upper extremity. Tolerating progressive passive stretches into wrist flexion, extension and supination without complaint of increased pain. Continue to work on improving self range of motion in HEP to patient's tolerance. Noted improving functional supination today. Communication/Consultation:  none today  · Equipment provided today:  None today. · Recommendations/Intent for next treatment session: Next visit will focus on Increasing motion, strength and function in her right UE, and decreasing pain. .    Total Treatment Billable Duration:  60 minutes  OT Patient Time In/Time Out  Time In: 0345  Time Out: 41 [facility],     Future Appointments   Date Time Provider Lori Cummins   1/16/2020  3:15 PM Tyler Proper, OT [facility] SFE   1/20/2020  3:15 PM Tyler Proper, OT [facility]   1/22/2020  8:00 AM Tyler Proper, OT [facility] SFE   1/27/2020  3:15 PM Tyler Proper, OT [facility] SFE   1/30/2020  3:15 PM Tyler Proper, OT [facility]

## 2020-01-16 ENCOUNTER — HOSPITAL ENCOUNTER (OUTPATIENT)
Dept: PHYSICAL THERAPY | Age: 45
Discharge: HOME | End: 2020-01-16
Payer: COMMERCIAL

## 2020-01-16 PROCEDURE — 97110 THERAPEUTIC EXERCISES: CPT

## 2020-01-16 PROCEDURE — 97018 PARAFFIN BATH THERAPY: CPT

## 2020-01-16 PROCEDURE — 97140 MANUAL THERAPY 1/> REGIONS: CPT

## 2020-01-16 NOTE — PROGRESS NOTES
Obdulio Cobos  : 1975  Primary: [facility]  Secondary:  [address] at [address] [place] [address] [place] [address],[address], [place] [address].  [phone]   [phone]       OUTPATIENT OCCUPATIONAL THERAPY: Daily Treatment Note 2020  Visit Count:  31  CD-10: Treatment Diagnosis: Pain in right wrist (M25.531)Stiffness of right wrist, not elsewhere classified (M25.631)  Precautions/Allergies:   Atropine and Periactin   TREATMENT PLAN:  Effective Dates: 12/15/2019 TO 3/18/2020 (90 days). Frequency/Duration: 2 times a week for 90 Day(s  Pre-treatment Symptoms/Complaints: Pt states, \"I am still a little stiff. .\"  Pain: Initial: Pain Intensity 1: 3  Pain Location 1: Wrist  Pain Orientation 1: Right  Post Session:  2/10   Medications Last Reviewed:  2020   Updated Objective Findings:  None Today   TREATMENT:       Manual Therapy: (Soft Tissue Mobilization Duration  Duration: 15 Minutes  Duration: 15 Minutes): Technique: Retrograde massage  Tissue Mobilized: Connective tissue  RUE Soft Tissue Mobilization: Yes  Technique: Retrograde massage, Connective tissue(followed by PROM)  Tissue Mobilized: Scar/adhesion   Therapeutic Exercise:                                                                               : The patient was instructed in a home exercise program.                                                Date:  20 Date:  20 Date:  20 Date:  19 Date:  19 Date:  19 Date:  20 Date:  20   Activity/Exercise           AROM during Fluidotherapy 15 minutes 20 minutes 20 minutes 20 minutes 20 minutes 20 minutes 15 minutes 15 minutes   Paraffin with Stretch 15 min  supination 15 min  supination 15 min  supination        Retrograde massage, Friction Scar massage, Joint Mobilization 15 min 15 min 15 min        Scarf Curl           Washer Game 5 reps 5 reps 5 reps 5 reps 5 reps      Individual Gripper 25 reps 30 reps 30 reps        Hand [place] 20 reps    30 lbs 20 reps    30 lbs 20 reps    30 lbs 20 reps    30 lbs 20 reps    30 lbs 20 reps    30 lbs 20 reps    30 lbs 20 reps    30 lbs   Cones           Putty           Clothes Pins    Green x 30 Green x 30 Green x 30 Green x 30 Green x 30   A-R Bar 20 reps with extra hold in supination 20 reps with extra hold in supination 20 reps with extra hold in supination 20 reps with extra hold in supination 20 reps with extra hold in supination 20 reps with extra hold in supination 20 reps with extra hold in supination 20 reps with extra hold in supination   Exerstick 20 reps horizontal    20 reps vertical 20 reps horizontal    20 reps vertical 20 reps horizontal    20 reps vertical 20 reps horizontal    20 reps vertical 20 reps horizontal    20 reps vertical 20 reps horizontal    20 reps vertical 20 reps horizontal    20 reps vertical 20 reps horizontal    20 reps vertical   Sup/pro-Roll           PROM Ex 5 reps wrist flexion/extension 5 reps wrist flexion/extension 10 reps wrist flexion/extension 10 reps wrist flexion/extension 10 reps wrist flexion/extension 10 reps wrist flexion/extension 10 reps wrist flexion/extension 10 reps wrist flexion/extension   RESISTIVE EXERCISES           WEIGHT WELL           Sup/Pro           UD/RD           Wrist Flex/Ext           Free Weights 2 pounds  Wrist flexion and extension 2 & 3 pounds  Wrist flexion and extension 2& 3 pounds  Wrist flexion and extension 2 pounds x 10 reps  1 pound x 10 reps  Wrist flexion and extension 2 pounds   2 x 10 reps    Wrist flexion and extension 2 pounds   2 x 10 reps    Wrist flexion and extension 2 pounds   2 x 10 reps    Wrist flexion and extension 2 pounds   2 x 10 reps    Wrist flexion and extension   UBE(Minutes)           Nautilus           Compound Row           Vertical Chest           [employer]           Tennis ball toss and catch with right hand X 20 reps X 20 reps X 20 reps X 20 reps   X 20 reps X 20 reps     HEP: As above; handouts given to patient for all exercises. Therapeutic Modalities:      Right Wrist Heat  Type: Paraffin bath(with a supination stretch)  Duration : 15 minutes  Patient Position: Sitting                                       With 10 pound weighted supination stretch    Joint Mobilization:        Treatment Times:  · Therapeutic Exercise: 30 minutes  · Manual Therapy: 15 minutes  · Parafin:15  minutes  · Whirlpool:  minutes  · Other:  minutes    Treatment/Session Summary:    · Response to Treatment:  Patients tolerated treatment well with no complications. Upon completion of treatment, skin condition was normal. Patient demonstrating improving functional movement and coordination of the right upper extremity. Tolerating progressive passive stretches into wrist flexion, extension and supination without complaint of increased pain. Continue to work on improving self range of motion in HEP to patient's tolerance. Noted improving functional supination today. Communication/Consultation:  none today  · Equipment provided today:  None today. · Recommendations/Intent for next treatment session: Next visit will focus on Increasing motion, strength and function in her right UE, and decreasing pain. .    Total Treatment Billable Duration:  60 minutes  OT Patient Time In/Time Out  Time In: 0330  Time Out: 0430  Thalia Evangelista OT    Future Appointments   Date Time Provider Lori Cummins   1/20/2020  1:00 PM Hannah Blanco OT [facility]   1/28/2020  3:30 PM BISHOP Louie

## 2020-01-20 ENCOUNTER — APPOINTMENT (OUTPATIENT)
Dept: PHYSICAL THERAPY | Age: 45
End: 2020-01-20
Payer: COMMERCIAL

## 2020-01-21 ENCOUNTER — HOSPITAL ENCOUNTER (OUTPATIENT)
Dept: PHYSICAL THERAPY | Age: 45
Discharge: HOME | End: 2020-01-21
Payer: COMMERCIAL

## 2020-01-21 PROCEDURE — 97140 MANUAL THERAPY 1/> REGIONS: CPT

## 2020-01-21 PROCEDURE — 97018 PARAFFIN BATH THERAPY: CPT

## 2020-01-21 PROCEDURE — 97110 THERAPEUTIC EXERCISES: CPT

## 2020-01-22 ENCOUNTER — APPOINTMENT (OUTPATIENT)
Dept: PHYSICAL THERAPY | Age: 45
End: 2020-01-22
Payer: COMMERCIAL

## 2020-01-22 NOTE — PROGRESS NOTES
Alena Sam  : 1975  Primary: [facility]  Secondary:  [address] at [address], [address] [place] [address],[address], [place] [address].  [phone]   [phone]       OUTPATIENT OCCUPATIONAL THERAPY: Daily Treatment Note 2020  Visit Count:  32  CD-10: Treatment Diagnosis: Pain in right wrist (M25.531)Stiffness of right wrist, not elsewhere classified (M25.631)  Precautions/Allergies:   Atropine and Periactin   TREATMENT PLAN:  Effective Dates: 12/15/2019 TO 3/18/2020 (90 days). Frequency/Duration: 2 times a week for 90 Day(s  Pre-treatment Symptoms/Complaints: Pt states, \"I am still a little sore from lifting water bottles. .\"  Pain: Initial: Pain Intensity 1: 3  Pain Location 1: Wrist  Pain Orientation 1: Right  Post Session:  2/10   Medications Last Reviewed:  2020   Updated Objective Findings:  MD progress note completed   TREATMENT:       Manual Therapy: (Soft Tissue Mobilization Duration  Duration: 15 Minutes  Duration: 15 Minutes): Technique: Connective tissue, Retrograde massage  Tissue Mobilized: Connective tissue  RUE Soft Tissue Mobilization: Yes  Technique: Retrograde massage, Connective tissue(followed by PROM)  Tissue Mobilized: Scar/adhesion   Therapeutic Exercise:                                                                               : The patient was instructed in a home exercise program.                                                Date:  20 Date:  20 Date:  20 Date:  20 Date:  19 Date:  19 Date:  20 Date:  20   Activity/Exercise           AROM during Fluidotherapy 15 minutes 20 minutes 20 minutes 20 minutes 20 minutes 20 minutes 15 minutes 15 minutes   Paraffin with Stretch 15 min  supination 15 min  supination 15 min  supination 15 min  supination       Retrograde massage, Friction Scar massage, Joint Mobilization 15 min 15 min 15 min 15 min       Scarf Curl           Washer Game 5 reps 5 reps 5 reps 5 reps 5 reps      Individual Gripper 25 reps 30 reps 30 reps 30 reps       Hand [place] 20 reps    30 lbs 20 reps    30 lbs 20 reps    30 lbs 20 reps    30 lbs 20 reps    30 lbs 20 reps    30 lbs 20 reps    30 lbs 20 reps    30 lbs   Cones           Putty           Clothes Pins    Green x 30 Green x 30 Green x 30 Green x 30 Green x 30   A-R Bar 20 reps with extra hold in supination 20 reps with extra hold in supination 20 reps with extra hold in supination 20 reps with extra hold in supination 20 reps with extra hold in supination 20 reps with extra hold in supination 20 reps with extra hold in supination 20 reps with extra hold in supination   Exerstick 20 reps horizontal    20 reps vertical 20 reps horizontal    20 reps vertical 20 reps horizontal    20 reps vertical 20 reps horizontal    20 reps vertical 20 reps horizontal    20 reps vertical 20 reps horizontal    20 reps vertical 20 reps horizontal    20 reps vertical 20 reps horizontal    20 reps vertical   Sup/pro-Roll           PROM Ex 5 reps wrist flexion/extension 5 reps wrist flexion/extension 10 reps wrist flexion/extension 10 reps wrist flexion/extension 10 reps wrist flexion/extension 10 reps wrist flexion/extension 10 reps wrist flexion/extension 10 reps wrist flexion/extension   RESISTIVE EXERCISES           WEIGHT WELL           Sup/Pro           UD/RD           Wrist Flex/Ext           Free Weights 2 pounds  Wrist flexion and extension 2 & 3 pounds  Wrist flexion and extension 2& 3 pounds  Wrist flexion and extension 2 pounds x 10 reps     Wrist flexion and extension 2 pounds   2 x 10 reps    Wrist flexion and extension 2 pounds   2 x 10 reps    Wrist flexion and extension 2 pounds   2 x 10 reps    Wrist flexion and extension 2 pounds   2 x 10 reps    Wrist flexion and extension   UBE(Minutes)           Nautilus           Compound Row           Vertical Chest           [employer]           Tennis ball toss and catch with right hand X 20 reps X 20 reps X 20 reps X 20 reps   X 20 reps X 20 reps     HEP: As above; handouts given to patient for all exercises. Therapeutic Modalities:      Right Wrist Heat  Type: Paraffin bath(with a supination stretch)  Duration : 15 minutes  Patient Position: Sitting                                       With 10 pound weighted supination stretch    Joint Mobilization:        Treatment Times:  · Therapeutic Exercise: 30 minutes  · Manual Therapy: 15 minutes  · Parafin:15  minutes  · Whirlpool:  minutes  · Other:  minutes    Treatment/Session Summary:    · Response to Treatment:  Patients tolerated treatment well with no complications. Upon completion of treatment, skin condition was normal. Patient demonstrating improving functional movement and coordination of the right upper extremity. Tolerating progressive passive stretches into wrist flexion, extension and supination without complaint of increased pain. Continue to work on improving self range of motion in HEP to patient's tolerance. Noted improving functional supination today. Communication/Consultation:  none today  · Equipment provided today:  None today. · Recommendations/Intent for next treatment session: Next visit will focus on Increasing motion, strength and function in her right UE, and decreasing pain. .    Total Treatment Billable Duration:  60 minutes  OT Patient Time In/Time Out  Time In: 0400  Time Out: 0500  Yaneli Phillips OT    Future Appointments   Date Time Provider Lori Maria G   1/28/2020  3:30 PM BISHOP Jason

## 2020-01-27 ENCOUNTER — APPOINTMENT (OUTPATIENT)
Dept: PHYSICAL THERAPY | Age: 45
End: 2020-01-27
Payer: COMMERCIAL

## 2020-01-28 ENCOUNTER — HOSPITAL ENCOUNTER (OUTPATIENT)
Dept: PHYSICAL THERAPY | Age: 45
Discharge: HOME | End: 2020-01-28
Payer: COMMERCIAL

## 2020-01-28 PROCEDURE — 97110 THERAPEUTIC EXERCISES: CPT

## 2020-01-28 PROCEDURE — 97018 PARAFFIN BATH THERAPY: CPT

## 2020-01-28 PROCEDURE — 97140 MANUAL THERAPY 1/> REGIONS: CPT

## 2020-01-29 NOTE — PROGRESS NOTES
Luis Heller  : 1975  Primary: [facility]  Secondary:  [address]  at [place] [address],[address] [facility], [place] [address].  [phone]   [phone]       OUTPATIENT OCCUPATIONAL THERAPY: Daily Treatment Note 2020  Visit Count:  33  CD-10: Treatment Diagnosis: Pain in right wrist (M25.531)Stiffness of right wrist, not elsewhere classified (M25.631)  Precautions/Allergies:   Atropine and Periactin   TREATMENT PLAN:  Effective Dates: 12/15/2019 TO 3/18/2020 (90 days). Frequency/Duration: 2 times a week for 90 Day(s  Pre-treatment Symptoms/Complaints: Pt states, \" Dr. Lucia Quezada said I may have to have my hardware removed. .\"  Pain: Initial: Pain Intensity 1: 3  Pain Location 1: Wrist  Pain Orientation 1: Right  Post Session:  2/10   Medications Last Reviewed:  2020   Updated Objective Findings:  MD progress note completed   TREATMENT:       Manual Therapy: (Soft Tissue Mobilization Duration  Duration: 15 Minutes  Duration: 15 Minutes): Technique: Retrograde massage, Connective tissue  Tissue Mobilized: Connective tissue  RUE Soft Tissue Mobilization: Yes  Technique: Retrograde massage, Connective tissue(followed by PROM)  Tissue Mobilized: Scar/adhesion   Therapeutic Exercise:                                                                               : The patient was instructed in a home exercise program.                                                Date:  20 Date:  20 Date:  20 Date:  20 Date:  20 Date:  19 Date:  20 Date:  20   Activity/Exercise           AROM during Fluidotherapy 15 minutes 20 minutes 20 minutes 20 minutes 20 minutes 20 minutes 15 minutes 15 minutes   Paraffin with Stretch 15 min  supination 15 min  supination 15 min  supination 15 min  supination   15 min  Wrist ext.       Retrograde massage, Friction Scar massage, Joint Mobilization 15 min 15 min 15 min 15 min 15 min      Scarf Curl Washer Game 5 reps 5 reps 5 reps 5 reps 5 reps      Individual Gripper 25 reps 30 reps 30 reps 30 reps 30 reps      Hand [place] 20 reps    30 lbs 20 reps    30 lbs 20 reps    30 lbs 20 reps    30 lbs 20 reps    30 lbs 20 reps    30 lbs 20 reps    30 lbs 20 reps    30 lbs   Cones           Putty           Clothes Pins    Green x 30 Green x 30 Green x 30 Green x 30 Green x 30   A-R Bar 20 reps with extra hold in supination 20 reps with extra hold in supination 20 reps with extra hold in supination 20 reps with extra hold in supination 20 reps with extra hold in supination 20 reps with extra hold in supination 20 reps with extra hold in supination 20 reps with extra hold in supination   Exerstick 20 reps horizontal    20 reps vertical 20 reps horizontal    20 reps vertical 20 reps horizontal    20 reps vertical 20 reps horizontal    20 reps vertical 20 reps horizontal    20 reps vertical 20 reps horizontal    20 reps vertical 20 reps horizontal    20 reps vertical 20 reps horizontal    20 reps vertical   Sup/pro-Roll           PROM Ex 5 reps wrist flexion/extension 5 reps wrist flexion/extension 10 reps wrist flexion/extension 10 reps wrist flexion/extension 10 reps wrist flexion/extension 10 reps wrist flexion/extension 10 reps wrist flexion/extension 10 reps wrist flexion/extension   RESISTIVE EXERCISES           WEIGHT WELL           Sup/Pro           UD/RD           Wrist Flex/Ext           Free Weights 2 pounds  Wrist flexion and extension 2 & 3 pounds  Wrist flexion and extension 2& 3 pounds  Wrist flexion and extension 2 pounds x 10 reps     Wrist flexion and extension 2 pounds   2 x 10 reps    Wrist flexion and extension 2 pounds   2 x 10 reps    Wrist flexion and extension 2 pounds   2 x 10 reps    Wrist flexion and extension 2 pounds   2 x 10 reps    Wrist flexion and extension   UBE(Minutes)           Nautilus           Compound Row           Vertical Chest           [employer]           Tennis ball toss and catch with right hand X 20 reps X 20 reps X 20 reps X 20 reps   X 20 reps X 20 reps     HEP: As above; handouts given to patient for all exercises. Therapeutic Modalities:      Right Wrist Heat  Type: Paraffin bath(with a wrist extension stretch)  Duration : 15 minutes  Patient Position: Sitting                                       With 10 pound weighted supination stretch    Joint Mobilization:        Treatment Times:  · Therapeutic Exercise: 30 minutes  · Manual Therapy: 15 minutes  · Parafin:15  minutes  · Whirlpool:  minutes  · Other:  minutes    Treatment/Session Summary:    · Response to Treatment:  Patients tolerated treatment well with no complications. Upon completion of treatment, skin condition was normal. Patient demonstrating improving functional movement and coordination of the right upper extremity. Tolerating progressive passive stretches into wrist flexion, extension and supination without complaint of increased pain. Continue to work on improving self range of motion in HEP to patient's tolerance. Noted improving functional supination today. Communication/Consultation:  none today  · Equipment provided today:  None today. · Recommendations/Intent for next treatment session: Next visit will focus on Increasing motion, strength and function in her right UE, and decreasing pain. .    Total Treatment Billable Duration:  60 minutes  OT Patient Time In/Time Out  Time In: 0330  Time Out: 0430  Ignacio Acosta OT    Future Appointments   Date Time Provider Lori Cummins   2/3/2020  3:30 PM Dinora Saenz, OT [facility]   2/5/2020  4:00 PM Dinora Saenz, OT [facility]   2/10/2020  3:00 PM Dinora Saenz OT [facility]   2/12/2020  4:00 PM Dinora Saenz OT [facility]   2/18/2020  3:30 PM Dinora Saenz, OT [facility]   2/21/2020  3:30 PM Dinora Saenz, OT [facility]

## 2020-01-30 ENCOUNTER — APPOINTMENT (OUTPATIENT)
Dept: PHYSICAL THERAPY | Age: 45
End: 2020-01-30
Payer: COMMERCIAL

## 2020-02-03 ENCOUNTER — HOSPITAL ENCOUNTER (OUTPATIENT)
Dept: PHYSICAL THERAPY | Age: 45
Discharge: HOME | End: 2020-02-03
Payer: COMMERCIAL

## 2020-02-03 PROCEDURE — 97140 MANUAL THERAPY 1/> REGIONS: CPT

## 2020-02-03 PROCEDURE — 97110 THERAPEUTIC EXERCISES: CPT

## 2020-02-03 PROCEDURE — 97018 PARAFFIN BATH THERAPY: CPT

## 2020-02-05 ENCOUNTER — HOSPITAL ENCOUNTER (OUTPATIENT)
Dept: PHYSICAL THERAPY | Age: 45
Discharge: HOME | End: 2020-02-05
Payer: COMMERCIAL

## 2020-02-05 PROCEDURE — 97110 THERAPEUTIC EXERCISES: CPT

## 2020-02-05 PROCEDURE — 97018 PARAFFIN BATH THERAPY: CPT

## 2020-02-05 PROCEDURE — 97140 MANUAL THERAPY 1/> REGIONS: CPT

## 2020-02-05 NOTE — PROGRESS NOTES
Lorna Javier  : 1975  Primary: Sh[facility]  Secondary:  [address] at [address], [address] [place] [address],[address], [place] [address].  [phone]   [phone]       OUTPATIENT OCCUPATIONAL THERAPY: Daily Treatment Note 2020  Visit Count:  35  CD-10: Treatment Diagnosis: Pain in right wrist (M25.531)Stiffness of right wrist, not elsewhere classified (M25.631)  Precautions/Allergies:   Atropine and Periactin   TREATMENT PLAN:  Effective Dates: 12/15/2019 TO 3/18/2020 (90 days). Frequency/Duration: 2 times a week for 90 Day(s  Pre-treatment Symptoms/Complaints: Pt states, \" I am just a little sore. \"  Pain: Initial: Pain Intensity 1: 2  Pain Location 1: Wrist  Pain Orientation 1: Right  Post Session:  2/10   Medications Last Reviewed:  2/3/2020  Updated Objective Findings: None today   TREATMENT:       Manual Therapy: (Soft Tissue Mobilization Duration  Duration: 15 Minutes  Duration: 15 Minutes): Technique: Connective tissue, Retrograde massage  Tissue Mobilized: Connective tissue  RUE Soft Tissue Mobilization: Yes  Technique: Retrograde massage, Connective tissue(followed by PROM)  Tissue Mobilized: Scar/adhesion   Therapeutic Exercise:                                                                               : The patient was instructed in a home exercise program.                                                Date:  20 Date:  20 Date:  20 Date:  20 Date:  20 Date:  2/3/20 Date:  20 Date:  20   Activity/Exercise           AROM during Fluidotherapy 15 minutes 20 minutes 20 minutes 20 minutes 20 minutes 20 minutes 20 minutes 15 minutes   Paraffin with Stretch 15 min  supination 15 min  supination 15 min  supination 15 min  supination   15 min  Wrist ext. 15 min  Wrist flex 15 min  Wrist ext.     Retrograde massage, Friction Scar massage, Joint Mobilization 15 min 15 min 15 min 15 min 15 min 15 min 15 min    Scarf Curl Washer Game 5 reps 5 reps 5 reps 5 reps 5 reps 5 reps 5 min    Individual Gripper 25 reps 30 reps 30 reps 30 reps 30 reps 30 reps 30 reps    Hand [place] 20 reps    30 lbs 20 reps    30 lbs 20 reps    30 lbs 20 reps    30 lbs 20 reps    30 lbs 20 reps    30 lbs 20 reps    30 lbs 20 reps    30 lbs   Cones           Putty           Clothes Pins    Green x 30 Green x 30 Green x 30 Green x 30 Green x 30   A-R Bar 20 reps with extra hold in supination 20 reps with extra hold in supination 20 reps with extra hold in supination 20 reps with extra hold in supination 20 reps with extra hold in supination 20 reps with extra hold in supination 20 reps with extra hold in supination 20 reps with extra hold in supination   Exerstick 20 reps horizontal    20 reps vertical 20 reps horizontal    20 reps vertical 20 reps horizontal    20 reps vertical 20 reps horizontal    20 reps vertical 20 reps horizontal    20 reps vertical 20 reps horizontal    20 reps vertical 20 reps horizontal    20 reps vertical 20 reps horizontal    20 reps vertical   Sup/pro-Roll           PROM Ex 5 reps wrist flexion/extension 5 reps wrist flexion/extension 10 reps wrist flexion/extension 10 reps wrist flexion/extension 10 reps wrist flexion/extension 10 reps wrist flexion/extension 10 reps wrist flexion/extension 10 reps wrist flexion/extension   RESISTIVE EXERCISES           WEIGHT WELL           Sup/Pro           UD/RD           Wrist Flex/Ext           Free Weights 2 pounds  Wrist flexion and extension 2 & 3 pounds  Wrist flexion and extension 2& 3 pounds  Wrist flexion and extension 2 pounds x 10 reps     Wrist flexion and extension 2 pounds   2 x 10 reps    Wrist flexion and extension 2 pounds   2 x 10 reps    Wrist flexion and extension 2 pounds   2 x 10 reps    Wrist flexion and extension 2 pounds   2 x 10 reps    Wrist flexion and extension   UBE(Minutes)           Nautilus           Compound Row           Vertical Chest           [place] Tennis ball toss and catch with right hand X 20 reps X 20 reps X 20 reps X 20 reps    X 20 reps     HEP: As above; handouts given to patient for all exercises. Therapeutic Modalities:      Right Wrist Heat  Type: Paraffin bath  Duration : 15 minutes  Patient Position: Sitting                                       With 10 pound weighted wrist extension stretch    Joint Mobilization:        Treatment Times:  · Therapeutic Exercise: 30 minutes  · Manual Therapy: 15 minutes  · Parafin:15  minutes  · Whirlpool:  minutes  · Other:  minutes    Treatment/Session Summary:    · Response to Treatment:  Patients tolerated treatment well with no complications. Upon completion of treatment, skin condition was normal. Patient demonstrating improving functional movement and coordination of the right upper extremity. Tolerating progressive passive stretches into wrist flexion, extension and supination without complaint of increased pain. Continue to work on improving self range of motion in HEP to patient's tolerance. Noted improving functional supination today. Communication/Consultation:  none today  · Equipment provided today:  None today. · Recommendations/Intent for next treatment session: Next visit will focus on Increasing motion, strength and function in her right UE, and decreasing pain. .    Total Treatment Billable Duration:  60 minutes  OT Patient Time In/Time Out  Time In: 0400  Time Out: 0500  Nicol Zhao OT    Future Appointments   Date Time Provider Lori Cummins   2/10/2020  3:00 PM Deangelo Mason OT [facility]   2/12/2020  4:00 PM BISHOP Stanton   2/18/2020  3:30 PM BISHOP StantonORPCIRILO JOHNSON   2/21/2020  3:30 PM BISHOP StantonE

## 2020-02-10 ENCOUNTER — HOSPITAL ENCOUNTER (OUTPATIENT)
Dept: PHYSICAL THERAPY | Age: 45
Discharge: HOME | End: 2020-02-10
Payer: COMMERCIAL

## 2020-02-10 PROCEDURE — 97140 MANUAL THERAPY 1/> REGIONS: CPT

## 2020-02-10 PROCEDURE — 97110 THERAPEUTIC EXERCISES: CPT

## 2020-02-10 PROCEDURE — 97018 PARAFFIN BATH THERAPY: CPT

## 2020-02-10 NOTE — PROGRESS NOTES
Marcia Fillers  : 1975  Primary: Cira Rivers  Secondary:  [address] at [place] [address],[address], [place] [address].  [phone]   [phone]       OUTPATIENT OCCUPATIONAL THERAPY: Daily Treatment Note 2/10/2020  Visit Count:  36  CD-10: Treatment Diagnosis: Pain in right wrist (M25.531)Stiffness of right wrist, not elsewhere classified (M25.631)  Precautions/Allergies:   Atropine and Periactin   TREATMENT PLAN:  Effective Dates: 12/15/2019 TO 3/18/2020 (90 days). Frequency/Duration: 2 times a week for 90 Day(s  Pre-treatment Symptoms/Complaints: Pt states, \" I am can turn my wrist up now. .\"  Pain: Initial: Pain Intensity 1: 2  Pain Location 1: Wrist  Pain Orientation 1: Right  Post Session:  2/10   Medications Last Reviewed: 2/10/2020   Updated Objective Findings: None today   TREATMENT:       Manual Therapy: (Soft Tissue Mobilization Duration  Duration: 15 Minutes  Duration: 15 Minutes): Technique: Retrograde massage  Tissue Mobilized: Connective tissue  RUE Soft Tissue Mobilization: Yes  Technique: Retrograde massage, Connective tissue(followed by PROM)  Tissue Mobilized: Scar/adhesion   Therapeutic Exercise:                                                                               : The patient was instructed in a home exercise program.                                                Date:  20 Date:  20 Date:  20 Date:  20 Date:  20 Date:  2/3/20 Date:  20 Date:  2/10/20   Activity/Exercise           AROM during Fluidotherapy 15 minutes 20 minutes 20 minutes 20 minutes 20 minutes 20 minutes 20 minutes 20 minutes   Paraffin with Stretch 15 min  supination 15 min  supination 15 min  supination 15 min  supination   15 min  Wrist ext. 15 min  Wrist flex 15 min  Wrist ext.  15 min  Wrist flexion   Retrograde massage, Friction Scar massage, Joint Mobilization 15 min 15 min 15 min 15 min 15 min 15 min 15 min 15 min   Scarf Curl           Washer Game 5 reps 5 reps 5 reps 5 reps 5 reps 5 reps 5 min 2 min   Individual Gripper 25 reps 30 reps 30 reps 30 reps 30 reps 30 reps 30 reps 30 reps   Hand [place] 20 reps    30 lbs 20 reps    30 lbs 20 reps    30 lbs 20 reps    30 lbs 20 reps    30 lbs 20 reps    30 lbs 20 reps    30 lbs 20 reps    30 lbs   Cones           Putty           Clothes Pins    Green x 30 Green x 30 Green x 30 Green x 30 Green x 30   A-R Bar 20 reps with extra hold in supination 20 reps with extra hold in supination 20 reps with extra hold in supination 20 reps with extra hold in supination 20 reps with extra hold in supination 20 reps with extra hold in supination 20 reps with extra hold in supination 20 reps with extra hold in supination   Exerstick 20 reps horizontal    20 reps vertical 20 reps horizontal    20 reps vertical 20 reps horizontal    20 reps vertical 20 reps horizontal    20 reps vertical 20 reps horizontal    20 reps vertical 20 reps horizontal    20 reps vertical 20 reps horizontal    20 reps vertical 20 reps horizontal    20 reps vertical   Sup/pro-Roll           PROM Ex 5 reps wrist flexion/extension 5 reps wrist flexion/extension 10 reps wrist flexion/extension 10 reps wrist flexion/extension 10 reps wrist flexion/extension 10 reps wrist flexion/extension 10 reps wrist flexion/extension 10 reps wrist flexion/extension   RESISTIVE EXERCISES           WEIGHT WELL           Sup/Pro           UD/RD           Wrist Flex/Ext           Free Weights 2 pounds  Wrist flexion and extension 2 & 3 pounds  Wrist flexion and extension 2& 3 pounds  Wrist flexion and extension 2 pounds x 10 reps     Wrist flexion and extension 2 pounds   2 x 10 reps    Wrist flexion and extension 2 pounds   2 x 10 reps    Wrist flexion and extension 2 pounds   2 x 10 reps    Wrist flexion and extension 2 pounds   2 x 10 reps  3 lbs.  X 10  Wrist flexion and extension   UBE(Minutes)           Nautilus           Compound Row Vertical Chest           Overhead Press           Tennis ball toss and catch with right hand X 20 reps X 20 reps X 20 reps X 20 reps         HEP: As above; handouts given to patient for all exercises. Therapeutic Modalities:      Right Wrist Heat  Type: Paraffin bath(with a wrist flexion stretch)  Duration : 15 minutes  Patient Position: Sitting                                       With 10 pound weighted wrist extension stretch    Joint Mobilization:        Treatment Times:  · Therapeutic Exercise: 30 minutes  · Manual Therapy: 15 minutes  · Parafin:15  minutes  · Whirlpool:  minutes  · Other:  minutes    Treatment/Session Summary:    · Response to Treatment:  Patients tolerated treatment well with no complications. Upon completion of treatment, skin condition was normal. Patient demonstrating improving functional movement and coordination of the right upper extremity. Tolerating progressive passive stretches into wrist flexion, extension and supination without complaint of increased pain. Continue to work on improving self range of motion in HEP to patient's tolerance. Noted improving functional supination today. Communication/Consultation:  none today  · Equipment provided today:  None today. · Recommendations/Intent for next treatment session: Next visit will focus on Increasing motion, strength and function in her right UE, and decreasing pain. .    Total Treatment Billable Duration:  60 minutes  OT Patient Time In/Time Out  Time In: 0300  Time Out: 0400  Misael Bullard OT    Future Appointments   Date Time Provider Lori Cummins   2/12/2020  4:00 PM Aaron Cali OT [facility]   2/18/2020  3:30 PM BISHOP Mohan   2/21/2020  3:30 PM BISHOP Mohan

## 2020-02-12 ENCOUNTER — HOSPITAL ENCOUNTER (OUTPATIENT)
Dept: PHYSICAL THERAPY | Age: 45
Discharge: HOME | End: 2020-02-12
Payer: COMMERCIAL

## 2020-02-12 PROCEDURE — 97018 PARAFFIN BATH THERAPY: CPT

## 2020-02-12 PROCEDURE — 97110 THERAPEUTIC EXERCISES: CPT

## 2020-02-12 PROCEDURE — 97140 MANUAL THERAPY 1/> REGIONS: CPT

## 2020-02-12 NOTE — PROGRESS NOTES
Phill Sheikh  : 1975  Primary: Jasmyn Patricia State  Secondary:  [address] at [place] [id number] [facility], [address] [place] [address],[address], [address] [facility]  [phone]   [phone]       OUTPATIENT OCCUPATIONAL THERAPY: Daily Treatment Note 2020  Visit Count:  37  CD-10: Treatment Diagnosis: Pain in right wrist (M25.531)Stiffness of right wrist, not elsewhere classified (M25.631)  Precautions/Allergies:   Atropine and Periactin   TREATMENT PLAN:  Effective Dates: 12/15/2019 TO 3/18/2020 (90 days). Frequency/Duration: 2 times a week for 90 Day(s  Pre-treatment Symptoms/Complaints: Pt states, \" I am a little sore. .\"  Pain: Initial: Pain Intensity 1: 2  Pain Location 1: Wrist  Pain Orientation 1: Right  Post Session:  2/10   Medications Last Reviewed: 2020   Updated Objective Findings: None today   TREATMENT:       Manual Therapy: (Soft Tissue Mobilization Duration  Duration: 15 Minutes  Duration: 15 Minutes): Technique: Connective tissue, Retrograde massage  Tissue Mobilized: Connective tissue  RUE Soft Tissue Mobilization: Yes  Technique: Retrograde massage, Connective tissue(followed by PROM)  Tissue Mobilized: Scar/adhesion   Therapeutic Exercise:                                                                               : The patient was instructed in a home exercise program.                                                Date:  20 Date:  20 Date:  20 Date:  20 Date:  20 Date:  2/3/20 Date:  20 Date:  2/10/20   Activity/Exercise           AROM during Fluidotherapy 15 minutes 20 minutes 20 minutes 20 minutes 20 minutes 20 minutes 20 minutes 20 minutes   Paraffin with Stretch 15 min  extension 15 min  supination 15 min  supination 15 min  supination   15 min  Wrist ext. 15 min  Wrist flex 15 min  Wrist ext.  15 min  Wrist flexion   Retrograde massage, Friction Scar massage, Joint Mobilization 15 min 15 min 15 min 15 min 15 min 15 min 15 min 15 min Scarf Curl           Washer Game 5 reps 5 reps 5 reps 5 reps 5 reps 5 reps 5 min 2 min   Individual Gripper 25 reps 30 reps 30 reps 30 reps 30 reps 30 reps 30 reps 30 reps   Hand [place] [place] 20 reps    30 lbs 20 reps    30 lbs 20 reps    30 lbs 20 reps    30 lbs 20 reps    30 lbs 20 reps    30 lbs 20 reps    30 lbs 20 reps    30 lbs   Cones           Putty           Clothes Pins    Green x 30 Green x 30 Green x 30 Green x 30 Green x 30   A-R Bar 20 reps with extra hold in supination 20 reps with extra hold in supination 20 reps with extra hold in supination 20 reps with extra hold in supination 20 reps with extra hold in supination 20 reps with extra hold in supination 20 reps with extra hold in supination 20 reps with extra hold in supination   Exerstick 20 reps horizontal    20 reps vertical 20 reps horizontal    20 reps vertical 20 reps horizontal    20 reps vertical 20 reps horizontal    20 reps vertical 20 reps horizontal    20 reps vertical 20 reps horizontal    20 reps vertical 20 reps horizontal    20 reps vertical 20 reps horizontal    20 reps vertical   Sup/pro-Roll           PROM Ex 5 reps wrist flexion/extension 5 reps wrist flexion/extension 10 reps wrist flexion/extension 10 reps wrist flexion/extension 10 reps wrist flexion/extension 10 reps wrist flexion/extension 10 reps wrist flexion/extension 10 reps wrist flexion/extension   RESISTIVE EXERCISES           WEIGHT WELL           Sup/Pro           UD/RD           Wrist Flex/Ext           Free Weights 2 & 3 pounds  Wrist flexion and extension 2 & 3 pounds  Wrist flexion and extension 2& 3 pounds  Wrist flexion and extension 2 pounds x 10 reps     Wrist flexion and extension 2 pounds   2 x 10 reps    Wrist flexion and extension 2 pounds   2 x 10 reps    Wrist flexion and extension 2 pounds   2 x 10 reps    Wrist flexion and extension 2 pounds   2 x 10 reps  3 lbs.  X 10  Wrist flexion and extension   UBE(Minutes)           Nautilus           Compound Row Vertical Chest           Overhead Press           Tennis ball toss and catch with right hand  X 20 reps X 20 reps X 20 reps         HEP: As above; handouts given to patient for all exercises. Therapeutic Modalities:      Right Wrist Heat  Type: Paraffin bath(with a wrist extension stretch)  Duration : 15 minutes  Patient Position: Sitting                                       With 10 pound weighted wrist extension stretch    Joint Mobilization:        Treatment Times:  · Therapeutic Exercise: 30 minutes  · Manual Therapy: 15 minutes  · Parafin:15  minutes  · Whirlpool:  minutes  · Other:  minutes    Treatment/Session Summary:    · Response to Treatment:  Patients tolerated treatment well with no complications. Upon completion of treatment, skin condition was normal. Patient demonstrating improving functional movement and coordination of the right upper extremity. Tolerating progressive passive stretches into wrist flexion, extension and supination without complaint of increased pain. Continue to work on improving self range of motion in HEP to patient's tolerance. Noted improving functional supination today. Communication/Consultation:  none today  · Equipment provided today:  None today. · Recommendations/Intent for next treatment session: Next visit will focus on Increasing motion, strength and function in her right UE, and decreasing pain. .    Total Treatment Billable Duration:  60 minutes  OT Patient Time In/Time Out  Time In: 0400  Time Out: 0500  Wood Phillips OT    Future Appointments   Date Time Provider Lori Cummins   2/18/2020  3:30 PM Saida Palmer OT [facility]   2/21/2020  3:30 PM BISHOP Adams

## 2020-02-18 ENCOUNTER — HOSPITAL ENCOUNTER (OUTPATIENT)
Dept: PHYSICAL THERAPY | Age: 45
Discharge: HOME | End: 2020-02-18
Payer: COMMERCIAL

## 2020-02-18 PROCEDURE — 97110 THERAPEUTIC EXERCISES: CPT

## 2020-02-18 PROCEDURE — 97140 MANUAL THERAPY 1/> REGIONS: CPT

## 2020-02-18 PROCEDURE — 97018 PARAFFIN BATH THERAPY: CPT

## 2020-02-19 NOTE — PROGRESS NOTES
Eleonora Miller  : 1975  [facility]  Secondary:  [address]  at [address], [address] [place] [address],[address], [place] [address].  [phone]   [phone]       OUTPATIENT OCCUPATIONAL THERAPY: Daily Treatment Note 2020  Visit Count:  38  CD-10: Treatment Diagnosis: Pain in right wrist (M25.531)Stiffness of right wrist, not elsewhere classified (M25.631)  Precautions/Allergies:   Atropine and Periactin   TREATMENT PLAN:  Effective Dates: 12/15/2019 TO 3/18/2020 (90 days). Frequency/Duration: 2 times a week for 90 Day(s  Pre-treatment Symptoms/Complaints: Pt states, \" I am a little achey. .\"  Pain: Initial: Pain Intensity 1: 3  Pain Location 1: Wrist  Pain Orientation 1: Right  Post Session:  2/10   Medications Last Reviewed: 2020  Updated Objective Findings: None today   TREATMENT:       Manual Therapy: (Soft Tissue Mobilization Duration  Duration: 15 Minutes  Duration: 15 Minutes): Technique: Connective tissue, Retrograde massage  Tissue Mobilized: Connective tissue  RUE Soft Tissue Mobilization: Yes  Technique: Retrograde massage, Connective tissue(followed by PROM)  Tissue Mobilized: Scar/adhesion   Therapeutic Exercise:                                                                               : The patient was instructed in a home exercise program.                                                Date:  20 Date:  20 Date:  20 Date:  20 Date:  20 Date:  2/3/20 Date:  20 Date:  2/10/20   Activity/Exercise           AROM during Fluidotherapy 15 minutes 20 minutes 20 minutes 20 minutes 20 minutes 20 minutes 20 minutes 20 minutes   Paraffin with Stretch 15 min  extension 15 min  extension 15 min  supination 15 min  supination   15 min  Wrist ext. 15 min  Wrist flex 15 min  Wrist ext.  15 min  Wrist flexion   Retrograde massage, Friction Scar massage, Joint Mobilization 15 min 15 min 15 min 15 min 15 min 15 min 15 min 15 min Scarf Curl           Washer Game 5 reps 5 reps 5 reps 5 reps 5 reps 5 reps 5 min 2 min   Individual Gripper 25 reps 30 reps 30 reps 30 reps 30 reps 30 reps 30 reps 30 reps   Hand [place] 20 reps    30 lbs 20 reps    30 lbs 20 reps    30 lbs 20 reps    30 lbs 20 reps    30 lbs 20 reps    30 lbs 20 reps    30 lbs 20 reps    30 lbs   Cones           Putty           Clothes Pins    Green x 30 Green x 30 Green x 30 Green x 30 Green x 30   A-R Bar 20 reps with extra hold in supination 20 reps with extra hold in supination 20 reps with extra hold in supination 20 reps with extra hold in supination 20 reps with extra hold in supination 20 reps with extra hold in supination 20 reps with extra hold in supination 20 reps with extra hold in supination   Exerstick 20 reps horizontal    20 reps vertical 20 reps horizontal    20 reps vertical 20 reps horizontal    20 reps vertical 20 reps horizontal    20 reps vertical 20 reps horizontal    20 reps vertical 20 reps horizontal    20 reps vertical 20 reps horizontal    20 reps vertical 20 reps horizontal    20 reps vertical   Sup/pro-Roll           PROM Ex 5 reps wrist flexion/extension 5 reps wrist flexion/extension 10 reps wrist flexion/extension 10 reps wrist flexion/extension 10 reps wrist flexion/extension 10 reps wrist flexion/extension 10 reps wrist flexion/extension 10 reps wrist flexion/extension   RESISTIVE EXERCISES           WEIGHT WELL           Sup/Pro           UD/RD           Wrist Flex/Ext           Free Weights 2 & 3 pounds  Wrist flexion and extension 2 & 3 pounds  Wrist flexion and extension 2& 3 pounds  Wrist flexion and extension 2 pounds x 10 reps     Wrist flexion and extension 2 pounds   2 x 10 reps    Wrist flexion and extension 2 pounds   2 x 10 reps    Wrist flexion and extension 2 pounds   2 x 10 reps    Wrist flexion and extension 2 pounds   2 x 10 reps  3 lbs.  X 10  Wrist flexion and extension   UBE(Minutes)           Nautilus           Compound Row Vertical Chest           Overhead Press           Tennis ball toss and catch with right hand  X 20 reps X 20 reps X 20 reps         HEP: As above; handouts given to patient for all exercises. Therapeutic Modalities:      Right Wrist Heat  Type: Paraffin bath(with a wrist extension stretch)  Duration : 15 minutes  Patient Position: Sitting                                       With 10 pound weighted wrist extension stretch    Joint Mobilization:        Treatment Times:  · Therapeutic Exercise: 30 minutes  · Manual Therapy: 15 minutes  · Parafin:15  minutes  · Whirlpool:  minutes  · Other:  minutes    Treatment/Session Summary:    · Response to Treatment:  Patients tolerated treatment well with no complications. Upon completion of treatment, skin condition was normal. Patient demonstrating improving functional movement and coordination of the right upper extremity. Tolerating progressive passive stretches into wrist flexion, extension and supination without complaint of increased pain. Continue to work on improving self range of motion in HEP to patient's tolerance. Noted improving functional supination today. Communication/Consultation:  none today  · Equipment provided today:  None today. · Recommendations/Intent for next treatment session: Next visit will focus on Increasing motion, strength and function in her right UE, and decreasing pain. .    Total Treatment Billable Duration:  60 minutes  OT Patient Time In/Time Out  Time In: 9998  Time Out: 75 Linda Armas OT    Future Appointments   Date Time Provider Lori Cummins   2/19/2020  4:00 PM Halle Tomlinson MD [facility]   2/21/2020  3:30 PM Manisha Hewitt, OT [facility]   2/24/2020  4:00 PM Manisha Hewitt, OT [facility]   2/26/2020  4:00 PM Manisha Hewitt, OT [facility]   3/2/2020  4:00 PM Manisha Ore, OT [facility]   3/3/2020  3:00 PM Manisha Ore, OT [facility]

## 2020-02-21 ENCOUNTER — HOSPITAL ENCOUNTER (OUTPATIENT)
Dept: PHYSICAL THERAPY | Age: 45
Discharge: HOME | End: 2020-02-21
Payer: COMMERCIAL

## 2020-02-24 ENCOUNTER — HOSPITAL ENCOUNTER (OUTPATIENT)
Dept: PHYSICAL THERAPY | Age: 45
Discharge: HOME | End: 2020-02-24
Payer: COMMERCIAL

## 2020-02-26 ENCOUNTER — HOSPITAL ENCOUNTER (OUTPATIENT)
Dept: PHYSICAL THERAPY | Age: 45
Discharge: HOME | End: 2020-02-26
Payer: COMMERCIAL

## 2020-02-26 PROCEDURE — 97110 THERAPEUTIC EXERCISES: CPT

## 2020-02-26 PROCEDURE — 97018 PARAFFIN BATH THERAPY: CPT

## 2020-02-26 PROCEDURE — 97140 MANUAL THERAPY 1/> REGIONS: CPT

## 2020-02-27 NOTE — PROGRESS NOTES
Monie Estrada  : 1975  Primary: Epimenio Leighann State  Secondary:  [address] at [address], [address] [facility]  [phone]   [phone]       OUTPATIENT OCCUPATIONAL THERAPY: Daily Treatment Note 2020  Visit Count:  39  CD-10: Treatment Diagnosis: Pain in right wrist (M25.531)Stiffness of right wrist, not elsewhere classified (M25.631)  Precautions/Allergies:   Atropine and Periactin   TREATMENT PLAN:  Effective Dates: 12/15/2019 TO 3/18/2020 (90 days). Frequency/Duration: 2 times a week for 90 Day(s  Pre-treatment Symptoms/Complaints: Pt states, \" I am ready to have my surgery\"  Pain: Initial: Pain Intensity 1: 3  Pain Location 1: Wrist  Pain Orientation 1: Right  Post Session:  2/10   Medications Last Reviewed: 2020  Updated Objective Findings: None today   TREATMENT:       Manual Therapy: (Soft Tissue Mobilization Duration  Duration: 15 Minutes  Duration: 15 Minutes): Technique: Connective tissue, Retrograde massage  Tissue Mobilized: Connective tissue  RUE Soft Tissue Mobilization: Yes  Technique: Retrograde massage, Connective tissue(followed by PROM)  Tissue Mobilized: Scar/adhesion   Therapeutic Exercise:                                                                               : The patient was instructed in a home exercise program.                                                Date:  20 Date:  20 Date:  20 Date:  20 Date:  20 Date:  2/3/20 Date:  20 Date:  2/10/20   Activity/Exercise           AROM during Fluidotherapy 15 minutes 20 minutes 20 minutes 20 minutes 20 minutes 20 minutes 20 minutes 20 minutes   Paraffin with Stretch 15 min  extension 15 min  extension 15 min  extension 15 min  supination   15 min  Wrist ext. 15 min  Wrist flex 15 min  Wrist ext.  15 min  Wrist flexion   Retrograde massage, Friction Scar massage, Joint Mobilization 15 min 15 min 15 min 15 min 15 min 15 min 15 min 15 min   Scarf Curl           Washer Game 5 reps 5 reps 5 reps 5 reps 5 reps 5 reps 5 min 2 min   Individual Gripper 25 reps 30 reps 30 reps 30 reps 30 reps 30 reps 30 reps 30 reps   Hand [place] 20 reps    30 lbs 20 reps    30 lbs 20 reps    30 lbs 20 reps    30 lbs 20 reps    30 lbs 20 reps    30 lbs 20 reps    30 lbs 20 reps    30 lbs   Cones           Putty           Clothes Pins    Green x 30 Green x 30 Green x 30 Green x 30 Green x 30   A-R Bar 20 reps with extra hold in supination 20 reps with extra hold in supination 20 reps with extra hold in supination 20 reps with extra hold in supination 20 reps with extra hold in supination 20 reps with extra hold in supination 20 reps with extra hold in supination 20 reps with extra hold in supination   Exerstick 20 reps horizontal    20 reps vertical 20 reps horizontal    20 reps vertical 20 reps horizontal    20 reps vertical 20 reps horizontal    20 reps vertical 20 reps horizontal    20 reps vertical 20 reps horizontal    20 reps vertical 20 reps horizontal    20 reps vertical 20 reps horizontal    20 reps vertical   Sup/pro-Roll           PROM Ex 5 reps wrist flexion/extension 5 reps wrist flexion/extension 10 reps wrist flexion/extension 10 reps wrist flexion/extension 10 reps wrist flexion/extension 10 reps wrist flexion/extension 10 reps wrist flexion/extension 10 reps wrist flexion/extension   RESISTIVE EXERCISES           WEIGHT WELL           Sup/Pro           UD/RD           Wrist Flex/Ext           Free Weights 2 & 3 pounds  Wrist flexion and extension 2 & 3 pounds  Wrist flexion and extension 2& 3 pounds  Wrist flexion and extension 2 pounds x 10 reps     Wrist flexion and extension 2 pounds   2 x 10 reps    Wrist flexion and extension 2 pounds   2 x 10 reps    Wrist flexion and extension 2 pounds   2 x 10 reps    Wrist flexion and extension 2 pounds   2 x 10 reps  3 lbs.  X 10  Wrist flexion and extension   UBE(Minutes)           Nautilus           Compound Row           Vertical Chest           Overhead Press           Tennis ball toss and catch with right hand  X 20 reps X 20 reps X 20 reps         HEP: As above; handouts given to patient for all exercises. Therapeutic Modalities:      Right Wrist Heat  Type: Paraffin bath(with a wrist extension stretch)  Duration : 15 minutes  Patient Position: Sitting                                       With 10 pound weighted wrist extension stretch    Joint Mobilization:        Treatment Times:  · Therapeutic Exercise: 30 minutes  · Manual Therapy: 15 minutes  · Parafin:15  minutes  · Whirlpool:  minutes  · Other:  minutes    Treatment/Session Summary:    · Response to Treatment:  Patients tolerated treatment well with no complications. Upon completion of treatment, skin condition was normal. Patient demonstrating improving functional movement and coordination of the right upper extremity. Tolerating progressive passive stretches into wrist flexion, extension and supination without complaint of increased pain. Continue to work on improving self range of motion in HEP to patient's tolerance. Noted improving functional supination today. Communication/Consultation:  none today  · Equipment provided today:  None today. · Recommendations/Intent for next treatment session: Next visit will focus on Increasing motion, strength and function in her right UE, and decreasing pain. .    Total Treatment Billable Duration:  60 minutes  OT Patient Time In/Time Out  Time In: 0400  Time Out: 0500  Drew Lyons OT    Future Appointments   Date Time Provider Lori Cummins   3/2/2020  4:00 PM BISHOP Austin   3/3/2020  3:00 PM BISHOP Austin

## 2020-03-02 ENCOUNTER — HOSPITAL ENCOUNTER (OUTPATIENT)
Dept: PHYSICAL THERAPY | Age: 45
Discharge: HOME | End: 2020-03-02
Payer: COMMERCIAL

## 2020-03-02 PROCEDURE — 97110 THERAPEUTIC EXERCISES: CPT

## 2020-03-02 PROCEDURE — 97140 MANUAL THERAPY 1/> REGIONS: CPT

## 2020-03-02 PROCEDURE — 97018 PARAFFIN BATH THERAPY: CPT

## 2020-03-02 NOTE — PROGRESS NOTES
Obdulio Cobos  : 1975  Primary: [facility]  Secondary:  [address]  at [place] [address], [place] [address].  [phone]   [phone]       OUTPATIENT OCCUPATIONAL THERAPY: Daily Treatment Note 3/2/2020  Visit Count:  40  CD-10: Treatment Diagnosis: Pain in right wrist (M25.531)Stiffness of right wrist, not elsewhere classified (M25.631)  Precautions/Allergies:   Atropine and Periactin   TREATMENT PLAN:  Effective Dates: 12/15/2019 TO 3/18/2020 (90 days). Frequency/Duration: 2 times a week for 90 Day(s  Pre-treatment Symptoms/Complaints: Pt states, \" I am moving pretty well. Pain: Initial: Pain Intensity 1: 2  Pain Location 1: Wrist  Pain Orientation 1: Right  Post Session:  2/10   Medications Last Reviewed: 2020  Updated Objective Findings: None today   TREATMENT:       Manual Therapy: (Soft Tissue Mobilization Duration  Duration: 15 Minutes  Duration: 15 Minutes): Technique: Connective tissue, Retrograde massage  Tissue Mobilized: Connective tissue  RUE Soft Tissue Mobilization: Yes  Technique: Retrograde massage, Connective tissue(followed by PROM)  Tissue Mobilized: Scar/adhesion   Therapeutic Exercise:                                                                               : The patient was instructed in a home exercise program.                                                Date:  20 Date:  20 Date:  20 Date:  3/2/20 Date:  20 Date:  2/3/20 Date:  20 Date:  2/10/20   Activity/Exercise           AROM during Fluidotherapy 15 minutes 20 minutes 20 minutes 20 minutes 20 minutes 20 minutes 20 minutes 20 minutes   Paraffin with Stretch 15 min  extension 15 min  extension 15 min  extension 15 min  supination   15 min  Wrist ext. 15 min  Wrist flex 15 min  Wrist ext.  15 min  Wrist flexion   Retrograde massage, Friction Scar massage, Joint Mobilization 15 min 15 min 15 min 15 min 15 min 15 min 15 min 15 min Scarf Curl           [place] 5 reps 5 reps 5 reps 5 reps 5 reps 5 reps 5 min 2 min   Individual Gripper 25 reps 30 reps 30 reps 30 reps 30 reps 30 reps 30 reps 30 reps   Hand [place] 20 reps    30 lbs 20 reps    30 lbs 20 reps    30 lbs 20 reps    30 lbs 20 reps    30 lbs 20 reps    30 lbs 20 reps    30 lbs 20 reps    30 lbs   Cones           Putty           Clothes Pins    Green x 30 Green x 30 Green x 30 Green x 30 Green x 30   A-R Bar 20 reps with extra hold in supination 20 reps with extra hold in supination 20 reps with extra hold in supination 20 reps with extra hold in supination 20 reps with extra hold in supination 20 reps with extra hold in supination 20 reps with extra hold in supination 20 reps with extra hold in supination   Exerstick 20 reps horizontal    20 reps vertical 20 reps horizontal    20 reps vertical 20 reps horizontal    20 reps vertical 20 reps horizontal    20 reps vertical 20 reps horizontal    20 reps vertical 20 reps horizontal    20 reps vertical 20 reps horizontal    20 reps vertical 20 reps horizontal    20 reps vertical   Sup/pro-Roll           PROM Ex 5 reps wrist flexion/extension 5 reps wrist flexion/extension 10 reps wrist flexion/extension 10 reps wrist flexion/extension 10 reps wrist flexion/extension 10 reps wrist flexion/extension 10 reps wrist flexion/extension 10 reps wrist flexion/extension   RESISTIVE EXERCISES           WEIGHT WELL           Sup/Pro           UD/RD           Wrist Flex/Ext           Free Weights 2 & 3 pounds  Wrist flexion and extension 2 & 3 pounds  Wrist flexion and extension 2& 3 pounds  Wrist flexion and extension 2 pounds x 10 reps     Wrist flexion and extension 2 pounds   2 x 10 reps    Wrist flexion and extension 2 pounds   2 x 10 reps    Wrist flexion and extension 2 pounds   2 x 10 reps    Wrist flexion and extension 2 pounds   2 x 10 reps  3 lbs.  X 10  Wrist flexion and extension   UBE(Minutes)           Nautilus           Compound Row Vertical Chest           Overhead Press           Tennis ball toss and catch with right hand  X 20 reps X 20 reps X 20 reps         HEP: As above; handouts given to patient for all exercises. Therapeutic Modalities:      Right Wrist Heat  Type: Paraffin bath(with a wrist flexion stretch)  Duration : 15 minutes  Patient Position: Sitting                                       With 10 pound weighted wrist extension stretch    Joint Mobilization:        Treatment Times:  · Therapeutic Exercise: 30 minutes  · Manual Therapy: 15 minutes  · Parafin:15  minutes  · Whirlpool:  minutes  · Other:  minutes    Treatment/Session Summary:    · Response to Treatment:  Patients tolerated treatment well with no complications. Upon completion of treatment, skin condition was normal. Patient demonstrating improving functional movement and coordination of the right upper extremity. Tolerating progressive passive stretches into wrist flexion, extension and supination without complaint of increased pain. Continue to work on improving self range of motion in HEP to patient's tolerance. Noted improving functional supination today. Communication/Consultation:  none today  · Equipment provided today:  None today. · Recommendations/Intent for next treatment session: Next visit will focus on Increasing motion, strength and function in her right UE, and decreasing pain. .    Total Treatment Billable Duration:  60 minutes  OT Patient Time In/Time Out  Time In: 1837  Time Out: 75 Linda Armas OT    Future Appointments   Date Time Provider Lori Cummins   3/3/2020  3:00 PM BISHOP Louie

## 2020-03-03 ENCOUNTER — HOSPITAL ENCOUNTER (OUTPATIENT)
Dept: PHYSICAL THERAPY | Age: 45
Discharge: HOME | End: 2020-03-03
Payer: COMMERCIAL

## 2020-03-03 PROCEDURE — 97110 THERAPEUTIC EXERCISES: CPT

## 2020-03-03 PROCEDURE — 97018 PARAFFIN BATH THERAPY: CPT

## 2020-03-03 PROCEDURE — 97140 MANUAL THERAPY 1/> REGIONS: CPT

## 2020-03-03 NOTE — PROGRESS NOTES
Jose Alberto Fuentes  : 1975  Primary: [facility]  Secondary:  [address] at [place] [id number] [facility], [address] [place] [address],[address], [place] [address].  [phone]   [phone]       OUTPATIENT OCCUPATIONAL THERAPY: Daily Treatment Note 3/3/2020  Visit Count:  41  CD-10: Treatment Diagnosis: Pain in right wrist (M25.531)Stiffness of right wrist, not elsewhere classified (M25.631)  Precautions/Allergies:   Atropine and Periactin   TREATMENT PLAN:  Effective Dates: 12/15/2019 TO 3/18/2020 (90 days). Frequency/Duration: 2 times a week for 90 Day(s  Pre-treatment Symptoms/Complaints: Pt states, \" I am sore today, I must have over done it. Pain: Initial: Pain Intensity 1: 3  Pain Location 1: Wrist  Pain Orientation 1: Right  Post Session:  2/10   Medications Last Reviewed: 2020  Updated Objective Findings: Measurements taken for a MD progress note. TREATMENT:       Manual Therapy: (Soft Tissue Mobilization Duration  Duration: 15 Minutes  Duration: 15 Minutes): Technique: Connective tissue, Retrograde massage  Tissue Mobilized: Connective tissue  RUE Soft Tissue Mobilization: Yes  Technique: Retrograde massage, Connective tissue(followed by PROM)  Tissue Mobilized: Scar/adhesion   Therapeutic Exercise:                                                                               : The patient was instructed in a home exercise program.                                                Date:  20 Date:  20 Date:  20 Date:  3/2/20 Date:  3/3/20 Date:  2/3/20 Date:  20 Date:  2/10/20   Activity/Exercise           AROM during Fluidotherapy 15 minutes 20 minutes 20 minutes 20 minutes 20 minutes 20 minutes 20 minutes 20 minutes   Paraffin with Stretch 15 min  extension 15 min  extension 15 min  extension 15 min  supination   15 min  Wrist ext. 15 min  Wrist flex 15 min  Wrist ext.  15 min  Wrist flexion   Retrograde massage, Friction Scar massage, Joint Mobilization 15 min 15 min 15 min 15 min 15 min 15 min 15 min 15 min   Scarf Curl           [place] Game 5 reps 5 reps 5 reps 5 reps 5 reps 5 reps 5 min 2 min   Individual Gripper 25 reps 30 reps 30 reps 30 reps 30 reps 30 reps 30 reps 30 reps   Hand [place] 20 reps    30 lbs 20 reps    30 lbs 20 reps    30 lbs 20 reps    30 lbs 20 reps    30 lbs 20 reps    30 lbs 20 reps    30 lbs 20 reps    30 lbs   Cones           Putty           Clothes Pins    Green x 30 Green x 30 Green x 30 Green x 30 Green x 30   A-R Bar 20 reps with extra hold in supination 20 reps with extra hold in supination 20 reps with extra hold in supination 20 reps with extra hold in supination 20 reps with extra hold in supination 20 reps with extra hold in supination 20 reps with extra hold in supination 20 reps with extra hold in supination   Exerstick 20 reps horizontal    20 reps vertical 20 reps horizontal    20 reps vertical 20 reps horizontal    20 reps vertical 20 reps horizontal    20 reps vertical 20 reps horizontal    20 reps vertical 20 reps horizontal    20 reps vertical 20 reps horizontal    20 reps vertical 20 reps horizontal    20 reps vertical   Sup/pro-Roll           PROM Ex 5 reps wrist flexion/extension 5 reps wrist flexion/extension 10 reps wrist flexion/extension 10 reps wrist flexion/extension 10 reps wrist flexion/extension 10 reps wrist flexion/extension 10 reps wrist flexion/extension 10 reps wrist flexion/extension   RESISTIVE EXERCISES           WEIGHT WELL           Sup/Pro           UD/RD           Wrist Flex/Ext           Free Weights 2 & 3 pounds  Wrist flexion and extension 2 & 3 pounds  Wrist flexion and extension 2& 3 pounds  Wrist flexion and extension 2 pounds x 10 reps     Wrist flexion and extension 2 pounds   2 x 10 reps    Wrist flexion and extension 2 pounds   2 x 10 reps    Wrist flexion and extension 2 pounds   2 x 10 reps    Wrist flexion and extension 2 pounds   2 x 10 reps  3 lbs.  X 10  Wrist flexion and extension UBE(Minutes)           [place]           Compound Row           [facility]           [employer]           Tennis ball toss and catch with right hand  X 20 reps X 20 reps X 20 reps         HEP: As above; handouts given to patient for all exercises. Therapeutic Modalities:      Right Wrist Heat  Type: Paraffin bath(with a wrist extension stretch)  Duration : 15 minutes  Patient Position: Sitting                                       With 10 pound weighted wrist extension stretch    Joint Mobilization:        Treatment Times:  · Therapeutic Exercise: 30 minutes  · Manual Therapy: 15 minutes  · Parafin:15  minutes  · Whirlpool:  minutes  · Other:  minutes    Treatment/Session Summary:    · Response to Treatment:  Patients tolerated treatment well with no complications. Upon completion of treatment, skin condition was normal. Patient demonstrating improving functional movement and coordination of the right upper extremity. Tolerating progressive passive stretches into wrist flexion, extension and supination without complaint of increased pain. Continue to work on improving self range of motion in HEP to patient's tolerance. Noted improving functional supination today. Communication/Consultation:  none today  · Equipment provided today:  None today. · Recommendations/Intent for next treatment session: Next visit will focus on Increasing motion, strength and function in her right UE, and decreasing pain. .    Total Treatment Billable Duration:  60 minutes  OT Patient Time In/Time Out  Time In: 0300  Time Out: 0400  Danielle Milton OT    No future appointments.

## 2020-03-12 ENCOUNTER — HOSPITAL ENCOUNTER (OUTPATIENT)
Dept: PHYSICAL THERAPY | Age: 45
Discharge: HOME | End: 2020-03-12
Payer: COMMERCIAL

## 2020-06-18 NOTE — H&P
Date of Service: 2020-03-05  Work Status:  ????? Allergies:Atropine;Cyproheptadine HCl(allery to Periactin)  Medications:Dilaudid (2 MG, Take 1 tablet(s) by mouth every 3-4 hours as needed [PRN]);Spironolactone (50 MG); Tri-Sprintec (0.18/0. ... 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 She is now almost 8 months ORIF with a volar TriMed plate. She has been having some continued problems with the wrist. There is some fullness over the volar aspect of the wrist and the plate seems to be palpable in that region. She is not getting as good motion as I had hoped. The patient had previously been advised of the possibility of removing the plate and has decided she would like to go ahead and do so. She has been continuing with therapy. Her range of motion now is flexion 50 degrees, extension of 50 degrees. Supination is 75 and pronation is 90. She has good movement of her fingers. She has definite fullness along the volar aspect of the wrist.        RADIOGRAPHS:  Review of her x-rays showed a intraarticular comminuted fracture fixed with a volar TriMed plate. It looks to be a little prominent in some of the views. DISPOSITION:  I think this lady will do better if we do remove her plate. Hopefully it will help with her range of motion but also help prevent friction on her flexor tendons and possible rupture of those. The procedure can be done as an outpatient under either a brachial plexus block or possibly IV regional.      We will go ahead and schedule this for her at her convenience around the 1st or 2nd week in 04/2020 around her spring break from school.

## 2020-06-22 ENCOUNTER — ANESTHESIA EVENT (OUTPATIENT)
Dept: SURGERY | Age: 45
End: 2020-06-22
Payer: COMMERCIAL

## 2020-06-22 ENCOUNTER — HOSPITAL ENCOUNTER (OUTPATIENT)
Dept: LAB | Age: 45
Discharge: HOME | End: 2020-06-22
Payer: COMMERCIAL

## 2020-06-22 LAB — POTASSIUM SERPL-SCNC: 3.4 MMOL/L (ref 3.5–5.1)

## 2020-06-22 PROCEDURE — 84132 ASSAY OF SERUM POTASSIUM: CPT

## 2020-06-22 PROCEDURE — 36415 COLL VENOUS BLD VENIPUNCTURE: CPT

## 2020-06-22 RX ORDER — SODIUM CHLORIDE 0.9 % (FLUSH) 0.9 %
5-40 SYRINGE (ML) INJECTION AS NEEDED
Status: CANCELLED | OUTPATIENT
Start: 2020-06-22

## 2020-06-22 RX ORDER — SODIUM CHLORIDE 0.9 % (FLUSH) 0.9 %
5-40 SYRINGE (ML) INJECTION EVERY 8 HOURS
Status: CANCELLED | OUTPATIENT
Start: 2020-06-22

## 2020-06-23 ENCOUNTER — APPOINTMENT (OUTPATIENT)
Dept: GENERAL RADIOLOGY | Age: 45
End: 2020-06-23
Attending: ORTHOPAEDIC SURGERY
Payer: COMMERCIAL

## 2020-06-23 ENCOUNTER — HOSPITAL ENCOUNTER (OUTPATIENT)
Age: 45
Setting detail: OUTPATIENT SURGERY
Discharge: HOME | End: 2020-06-23
Attending: ORTHOPAEDIC SURGERY | Admitting: ORTHOPAEDIC SURGERY
Payer: COMMERCIAL

## 2020-06-23 ENCOUNTER — ANESTHESIA (OUTPATIENT)
Dept: SURGERY | Age: 45
End: 2020-06-23
Payer: COMMERCIAL

## 2020-06-23 VITALS
HEART RATE: 72 BPM | WEIGHT: 180.4 LBS | TEMPERATURE: 97.8 F | DIASTOLIC BLOOD PRESSURE: 66 MMHG | HEIGHT: 67 IN | SYSTOLIC BLOOD PRESSURE: 102 MMHG | RESPIRATION RATE: 16 BRPM | BODY MASS INDEX: 28.31 KG/M2 | OXYGEN SATURATION: 98 %

## 2020-06-23 DIAGNOSIS — G89.18 POST-OP PAIN: Primary | ICD-10-CM

## 2020-06-23 DIAGNOSIS — M25.539 WRIST PAIN: ICD-10-CM

## 2020-06-23 LAB — HCG UR QL: NEGATIVE

## 2020-06-23 PROCEDURE — 77030003602 HC NDL NRV BLK BBMI -B: Performed by: NURSE ANESTHETIST, CERTIFIED REGISTERED

## 2020-06-23 PROCEDURE — 76210000020 HC REC RM PH II FIRST 0.5 HR: Performed by: ORTHOPAEDIC SURGERY

## 2020-06-23 PROCEDURE — 77030033138 HC SUT PGA STRATFX J&J -B: Performed by: ORTHOPAEDIC SURGERY

## 2020-06-23 PROCEDURE — 76942 ECHO GUIDE FOR BIOPSY: CPT | Performed by: ORTHOPAEDIC SURGERY

## 2020-06-23 PROCEDURE — 76060000032 HC ANESTHESIA 0.5 TO 1 HR: Performed by: ORTHOPAEDIC SURGERY

## 2020-06-23 PROCEDURE — 81025 URINE PREGNANCY TEST: CPT

## 2020-06-23 PROCEDURE — 74011250636 HC RX REV CODE- 250/636: Performed by: ANESTHESIOLOGY

## 2020-06-23 PROCEDURE — 74011000250 HC RX REV CODE- 250: Performed by: NURSE ANESTHETIST, CERTIFIED REGISTERED

## 2020-06-23 PROCEDURE — 74011250636 HC RX REV CODE- 250/636: Performed by: NURSE ANESTHETIST, CERTIFIED REGISTERED

## 2020-06-23 PROCEDURE — 74011250636 HC RX REV CODE- 250/636: Performed by: ORTHOPAEDIC SURGERY

## 2020-06-23 PROCEDURE — 77030002933 HC SUT MCRYL J&J -A: Performed by: ORTHOPAEDIC SURGERY

## 2020-06-23 PROCEDURE — 77030000032 HC CUF TRNQT ZIMM -B: Performed by: ORTHOPAEDIC SURGERY

## 2020-06-23 PROCEDURE — 76210000063 HC OR PH I REC FIRST 0.5 HR: Performed by: ORTHOPAEDIC SURGERY

## 2020-06-23 PROCEDURE — 76010000138 HC OR TIME 0.5 TO 1 HR: Performed by: ORTHOPAEDIC SURGERY

## 2020-06-23 PROCEDURE — A4565 SLINGS: HCPCS | Performed by: ORTHOPAEDIC SURGERY

## 2020-06-23 PROCEDURE — 76010010054 HC POST OP PAIN BLOCK: Performed by: ORTHOPAEDIC SURGERY

## 2020-06-23 RX ORDER — PROPOFOL 10 MG/ML
INJECTION, EMULSION INTRAVENOUS AS NEEDED
Status: DISCONTINUED | OUTPATIENT
Start: 2020-06-23 | End: 2020-06-23 | Stop reason: HOSPADM

## 2020-06-23 RX ORDER — ONDANSETRON 2 MG/ML
INJECTION INTRAMUSCULAR; INTRAVENOUS AS NEEDED
Status: DISCONTINUED | OUTPATIENT
Start: 2020-06-23 | End: 2020-06-23 | Stop reason: HOSPADM

## 2020-06-23 RX ORDER — OXYCODONE AND ACETAMINOPHEN 5; 325 MG/1; MG/1
1 TABLET ORAL
Qty: 24 TAB | Refills: 0 | Status: SHIPPED | OUTPATIENT
Start: 2020-06-23 | End: 2020-06-27

## 2020-06-23 RX ORDER — FLUMAZENIL 0.1 MG/ML
0.2 INJECTION INTRAVENOUS
Status: DISCONTINUED | OUTPATIENT
Start: 2020-06-23 | End: 2020-06-23 | Stop reason: HOSPADM

## 2020-06-23 RX ORDER — LIDOCAINE HYDROCHLORIDE 10 MG/ML
0.1 INJECTION INFILTRATION; PERINEURAL AS NEEDED
Status: DISCONTINUED | OUTPATIENT
Start: 2020-06-23 | End: 2020-06-23 | Stop reason: HOSPADM

## 2020-06-23 RX ORDER — DEXAMETHASONE SODIUM PHOSPHATE 4 MG/ML
INJECTION, SOLUTION INTRA-ARTICULAR; INTRALESIONAL; INTRAMUSCULAR; INTRAVENOUS; SOFT TISSUE AS NEEDED
Status: DISCONTINUED | OUTPATIENT
Start: 2020-06-23 | End: 2020-06-23 | Stop reason: HOSPADM

## 2020-06-23 RX ORDER — LIDOCAINE HYDROCHLORIDE 20 MG/ML
INJECTION, SOLUTION EPIDURAL; INFILTRATION; INTRACAUDAL; PERINEURAL AS NEEDED
Status: DISCONTINUED | OUTPATIENT
Start: 2020-06-23 | End: 2020-06-23 | Stop reason: HOSPADM

## 2020-06-23 RX ORDER — MIDAZOLAM HYDROCHLORIDE 1 MG/ML
2 INJECTION, SOLUTION INTRAMUSCULAR; INTRAVENOUS
Status: COMPLETED | OUTPATIENT
Start: 2020-06-23 | End: 2020-06-23

## 2020-06-23 RX ORDER — FENTANYL CITRATE 50 UG/ML
100 INJECTION, SOLUTION INTRAMUSCULAR; INTRAVENOUS
Status: COMPLETED | OUTPATIENT
Start: 2020-06-23 | End: 2020-06-23

## 2020-06-23 RX ORDER — DIPHENHYDRAMINE HYDROCHLORIDE 50 MG/ML
12.5 INJECTION, SOLUTION INTRAMUSCULAR; INTRAVENOUS
Status: DISCONTINUED | OUTPATIENT
Start: 2020-06-23 | End: 2020-06-23 | Stop reason: HOSPADM

## 2020-06-23 RX ORDER — SODIUM CHLORIDE, SODIUM LACTATE, POTASSIUM CHLORIDE, CALCIUM CHLORIDE 600; 310; 30; 20 MG/100ML; MG/100ML; MG/100ML; MG/100ML
75 INJECTION, SOLUTION INTRAVENOUS CONTINUOUS
Status: DISCONTINUED | OUTPATIENT
Start: 2020-06-23 | End: 2020-06-23 | Stop reason: HOSPADM

## 2020-06-23 RX ORDER — CEFAZOLIN SODIUM/WATER 2 G/20 ML
2 SYRINGE (ML) INTRAVENOUS ONCE
Status: COMPLETED | OUTPATIENT
Start: 2020-06-23 | End: 2020-06-23

## 2020-06-23 RX ORDER — PROPOFOL 10 MG/ML
INJECTION, EMULSION INTRAVENOUS
Status: DISCONTINUED | OUTPATIENT
Start: 2020-06-23 | End: 2020-06-23 | Stop reason: HOSPADM

## 2020-06-23 RX ORDER — HYDROMORPHONE HYDROCHLORIDE 2 MG/ML
0.5 INJECTION, SOLUTION INTRAMUSCULAR; INTRAVENOUS; SUBCUTANEOUS
Status: DISCONTINUED | OUTPATIENT
Start: 2020-06-23 | End: 2020-06-23 | Stop reason: HOSPADM

## 2020-06-23 RX ORDER — MIDAZOLAM HYDROCHLORIDE 1 MG/ML
2 INJECTION, SOLUTION INTRAMUSCULAR; INTRAVENOUS
Status: DISCONTINUED | OUTPATIENT
Start: 2020-06-23 | End: 2020-06-23 | Stop reason: HOSPADM

## 2020-06-23 RX ORDER — OXYCODONE HYDROCHLORIDE 5 MG/1
5 TABLET ORAL
Status: DISCONTINUED | OUTPATIENT
Start: 2020-06-23 | End: 2020-06-23 | Stop reason: HOSPADM

## 2020-06-23 RX ORDER — NALOXONE HYDROCHLORIDE 0.4 MG/ML
0.1 INJECTION, SOLUTION INTRAMUSCULAR; INTRAVENOUS; SUBCUTANEOUS
Status: DISCONTINUED | OUTPATIENT
Start: 2020-06-23 | End: 2020-06-23 | Stop reason: HOSPADM

## 2020-06-23 RX ORDER — SODIUM CHLORIDE, SODIUM LACTATE, POTASSIUM CHLORIDE, CALCIUM CHLORIDE 600; 310; 30; 20 MG/100ML; MG/100ML; MG/100ML; MG/100ML
100 INJECTION, SOLUTION INTRAVENOUS CONTINUOUS
Status: DISCONTINUED | OUTPATIENT
Start: 2020-06-23 | End: 2020-06-23 | Stop reason: HOSPADM

## 2020-06-23 RX ADMIN — LIDOCAINE HYDROCHLORIDE 40 MG: 20 INJECTION, SOLUTION EPIDURAL; INFILTRATION; INTRACAUDAL; PERINEURAL at 07:59

## 2020-06-23 RX ADMIN — ROPIVACAINE HYDROCHLORIDE 35 ML: 5 INJECTION, SOLUTION EPIDURAL; INFILTRATION; PERINEURAL at 07:11

## 2020-06-23 RX ADMIN — ONDANSETRON 4 MG: 2 INJECTION INTRAMUSCULAR; INTRAVENOUS at 08:08

## 2020-06-23 RX ADMIN — Medication 2 G: at 08:02

## 2020-06-23 RX ADMIN — DEXAMETHASONE SODIUM PHOSPHATE 4 MG: 4 INJECTION, SOLUTION INTRAMUSCULAR; INTRAVENOUS at 08:08

## 2020-06-23 RX ADMIN — FENTANYL CITRATE 100 MCG: 50 INJECTION, SOLUTION INTRAMUSCULAR; INTRAVENOUS at 07:03

## 2020-06-23 RX ADMIN — SODIUM CHLORIDE, SODIUM LACTATE, POTASSIUM CHLORIDE, AND CALCIUM CHLORIDE 100 ML/HR: 600; 310; 30; 20 INJECTION, SOLUTION INTRAVENOUS at 06:30

## 2020-06-23 RX ADMIN — PROPOFOL 200 MCG/KG/MIN: 10 INJECTION, EMULSION INTRAVENOUS at 07:59

## 2020-06-23 RX ADMIN — PROPOFOL 30 MG: 10 INJECTION, EMULSION INTRAVENOUS at 07:59

## 2020-06-23 RX ADMIN — MIDAZOLAM 2 MG: 1 INJECTION INTRAMUSCULAR; INTRAVENOUS at 07:03

## 2020-06-23 NOTE — OP NOTES
[address] [facility]  OPERATIVE REPORT    Name:  Yifan Severino  MR#:  [id number]  :  1975  ACCOUNT #:  [de-identified]  DATE OF SERVICE:  2020    PREOPERATIVE DIAGNOSES:  1. Complications of orthopedic internal fixation device of the right distal radius. 2.  Pain of the right wrist.    POSTOPERATIVE DIAGNOSES:  1. Complications of orthopedic internal fixation device of the right distal radius. 2.  Pain of the right wrist.    PROCEDURE PERFORMED:  Removal of plate and screws from the right distal radius. SURGEON:  Jose David Owens MD    ASSISTANT:  None. ANESTHESIA:  Brachial plexus block. COMPLICATIONS:  None. SPECIMENS REMOVED:  None. IMPLANTS:  Implants removed:  TriMed distal radius plate and screws. No implants were implanted. ESTIMATED BLOOD LOSS:  Minimal.    PROCEDURE:  The patient was given a brachial plexus block in the preop area and brought to the operating room. She was positioned in the supine position with the right upper extremity outstretched on an arm board. She had been given preoperative IV Ancef prophylactically. The right upper extremity was prepped with ChloraPrep and draped as a sterile field. A time-out was held identifying the patient, surgeon, procedure, and operative site. The arm was exsanguinated with an Esmarch bandage and a pneumatic tourniquet inflated around the right upper arm. The patient's old longitudinal skin incision was utilized. This was opened and carried down through the skin and subcutaneous tissues. Spreading dissection with the small scissors were used to release some adhesions off of the subcutaneous tissue and the retinaculum over the FCR tendon. The FCR tendon sheath was opened then longitudinally. It was retracted out of harm's way. The FPL tendon was also present in the region and showed no fraying or wear. It was likewise retracted. The contents of the carpal tunnel were retracted ulnarward.   The plate could be seen under the fibrous tissue. The soft tissues were split along the margin of the plate exposing the shaft portion and then along the distal portion. The screws were all individually removed without difficulty. The plate was removed then by levering it up with a Maribel elevator. The fibrous material that had formed in the margins of the screw holes was removed with a rongeur. The patient's wrist was manipulated a bit. I was able to get about 65 degrees of both flexion and extension without overly stretching the wrist.  The wound was thoroughly irrigated. The sheath was closed back over the FCR tendon with 4-0 Monocryl. The subcutaneous tissues were closed then with inverted sutures of 4-0 Monocryl followed by running subcuticular stitch of 4-0 Stratafix. Steri-Strips were applied with Mastisol adhesive. A sterile gauze dressing and Tegaderm was then applied creating an occlusive dressing. An Ace wrap was used for compression. The patient tolerated the procedure well, was sent to recovery room in good condition.       MD ANTONIA [email or web address]  D:  06/23/2020 8:57  T:  06/23/2020 9:24  JOB #:  [id number]  CC:  [address] [place] [address]

## 2020-06-23 NOTE — DISCHARGE INSTRUCTIONS
POST OP INSTRUCTIONS      1. Patient has appointment for 7/2/20 at 2:00 PM at the [place] [address] office. 2.  For problems call Dr Ana Paula Almaraz, [facility],  [phone]          Appointments only,  [phone]    3. Ice and elevate the surgical site. 4.  May remove the Ace wrap, leave the Tegaderm dressing on, and wash in running water or shower. Do not submerge in bath or dish water. ACTIVITY  · As tolerated and as directed by your doctor. · Bathe or shower as directed by your doctor. DIET  · Clear liquids until no nausea or vomiting; then light diet for the first day. · Advance to regular diet on second day, unless your doctor orders otherwise. · If nausea and vomiting continues, call your doctor. PAIN  · Take pain medication as directed by your doctor. · Call your doctor if pain is NOT relieved by medication. · DO NOT take aspirin of blood thinners unless directed by your doctor. CALL YOUR DOCTOR IF   · Excessive bleeding that does not stop after holding pressure over the area  · Temperature of 101 degrees F or above  · Excessive redness, swelling or bruising, and/ or green or yellow, smelly discharge from incision    AFTER ANESTHESIA   · For the first 24 hours: DO NOT Drive, Drink alcoholic beverages, or Make important decisions. · Be aware of dizziness following anesthesia and while taking pain medication. DISCHARGE SUMMARY from Nurse    PATIENT INSTRUCTIONS:    After general anesthesia or intravenous sedation, for 24 hours or while taking prescription Narcotics:  · Limit your activities  · Do not drive and operate hazardous machinery  · Do not make important personal or business decisions  · Do  not drink alcoholic beverages  · If you have not urinated within 8 hours after discharge, please contact your surgeon on call. *  Please give a list of your current medications to your Primary Care Provider.     *  Please update this list whenever your medications are discontinued, doses are      changed, or new medications (including over-the-counter products) are added. *  Please carry medication information at all times in case of emergency situations. These are general instructions for a healthy lifestyle:    No smoking/ No tobacco products/ Avoid exposure to second hand smoke    Surgeon General's Warning:  Quitting smoking now greatly reduces serious risk to your health. Obesity, smoking, and sedentary lifestyle greatly increases your risk for illness    A healthy diet, regular physical exercise & weight monitoring are important for maintaining a healthy lifestyle    You may be retaining fluid if you have a history of heart failure or if you experience any of the following symptoms:  Weight gain of 3 pounds or more overnight or 5 pounds in a week, increased swelling in our hands or feet or shortness of breath while lying flat in bed. Please call your doctor as soon as you notice any of these symptoms; do not wait until your next office visit. Recognize signs and symptoms of STROKE:    F-face looks uneven    A-arms unable to move or move unevenly    S-speech slurred or non-existent    T-time-call 911 as soon as signs and symptoms begin-DO NOT go       Back to bed or wait to see if you get better-TIME IS BRAIN. Learning About COVID-19 and Social Distancing  What is it? Social distancing means putting space between yourself and other people. The recommended distance is 6 feet, or about 2 meters. This also means staying away from any place where people may gather, such as garcia or other public gathering places. Why is it important? Social distancing is the best way to reduce the spread of COVID-19. This virus seems to spread from person to person through droplets from coughing and sneezing. So if you keep your distance from others, you're less likely to get it or spread it.   And social distancing is important for everyone, not just those who are at high risk of infection, like older people. You might have the virus but not have symptoms. You could then give the infection to someone you come into contact with. How is it done? Putting 6 feet, or about 2 meters, between you and other people is the recommended distance. Also stay away from any place where people may gather, such as garcia or other public gathering places. So if possible:  · Work from home, and keep your kids at home. · Don't travel if you don't have to. And avoid public transportation, ride-shares, and taxis unless you have no choice. · Limit shopping to essentials, like food and medicines. · Wear a cloth face cover if you have to go to a public place like the grocery store or pharmacy. · Don't eat in restaurants. (You can still get takeout or food deliveries.)  · Avoid crowds and busy places. Follow stay-at-home orders or other directions for your area. Current as of: May 8, 2020               Content Version: 12.5  © 2006-2020 [employer]. Care instructions adapted under license by [employer] (which disclaims liability or warranty for this information). If you have questions about a medical condition or this instruction, always ask your healthcare professional. [address] any warranty or liability for your use of this information. Using a Sling: Care Instructions  Your Care Instructions  A sling supports your forearm. It keeps an injured arm or shoulder from moving. . If not, you can use a ready-made sling from a drugstore. But a sling can create problems. Keeping your arm in one position for too long can cause serious problems, such as frozen shoulder. Follow-up care is a key part of your treatment and safety. Be sure to make and go to all appointments, and call your doctor if you are having problems. It's also a good idea to know your test results and keep a list of the medicines you take.   How can you care for yourself at home? · To put a sling on by yourself without using your shoulder, place the sling on a table. Lower your forearm into the sling pocket. Then secure the strap(s). · If you are able, you can first strap the sling over your shoulder, then slip your forearm into the sling pocket. · Make sure that the sling allows your arm and shoulder to relax. · Follow your doctor's instructions for how often to:  ? Take the sling off. - You may remove sling when you can control your arm and the nerve block has worn off completely  ? Do exercises to prevent problems such as frozen shoulder. · If the fingers of the arm in the sling were not injured, wiggle them every now and then. This helps move the blood and fluids in the injured arm. · Keep up your muscle strength and tone as much as you can while protecting your injured arm or shoulder. Your doctor may want you to tense and relax the muscles protected by the sling, but only if it's not painful. Check with your doctor or your physical or occupational therapist for instructions. · Use the sling until your doctor tells you that you no longer need it. How long you wear a sling depends on your diagnosis and how you heal.  When should you call for help? Call your doctor now or seek immediate medical care if:  · Your pain gets a lot worse. · You cannot move your arm. · You have tingling, weakness, or numbness in your hand or arm. · Your arm or hand turns cold or changes color. · Your sling feels too tight, and you cannot loosen it. Watch closely for changes in your health, and be sure to contact your doctor if:  · You have new or worse swelling in your arm. · You have new pain that develops in another area of your arm. · You do not get better as expected. Where can you learn more? Go to http://sorin-harleen.info/  Enter U374 in the search box to learn more about \"Using a Sling: Care Instructions. \"  Current as of: March 2, 2020               Content Version: 12.5  © 4005-1726 [employer]. Care instructions adapted under license by [employer] (which disclaims liability or warranty for this information). If you have questions about a medical condition or this instruction, always ask your healthcare professional. [address] any warranty or liability for your use of this information. Narcotic-Analgesic/Acetaminophen (Percocet, Norco, Lorcet HD, Lortab 10/325) - (By mouth)   Why this medicine is used:   Relieves pain. Contact a nurse or doctor right away if you have:  · Extreme weakness, shallow breathing, slow heartbeat  · Severe confusion, lightheadedness, dizziness, fainting  · Yellow skin or eyes, dark urine or pale stools  · Severe constipation, severe stomach pain, nausea, vomiting, loss of appetite  · Sweating or cold, clammy skin     Common side effects:  · Mild constipation, nausea, vomiting  · Sleepiness, tiredness  · Itching, rash  © 2017 [facility] Information is for End User's use only and may not be sold, redistributed or otherwise used for commercial purposes.

## 2020-06-23 NOTE — PERIOP NOTES
[id number] assisted with  Dressing for Dc, sling in place, reviewed Dc instructions with patient, and then called pt's father per phone to review with him. He signed chart copy of dc instructions and placed on chart. pt also has prescription for Percocet, aware no Tylenol products while consuming Percocet

## 2020-06-23 NOTE — INTERVAL H&P NOTE
Update History & Physical    The Patient's History and Physical of March 5, 2020   was reviewed with the patient and I examined the patient. There was no change. The surgical site was confirmed by the patient and me. Pt is alert and oriented. Chest: Clear w/o SOB. C/V:  RR&R    Plan:  The risk, benefits, expected outcome, and alternative to the recommended procedure have been discussed with the patient. Patient understands and wants to proceed with removal of otero and screws from her right distal radius.     Electronically signed by Mavis Avalos MD on 6/23/2020 at 7:46 AM

## 2020-06-23 NOTE — ANESTHESIA PROCEDURE NOTES
Peripheral Block    Start time: 6/23/2020 7:08 AM  End time: 6/23/2020 7:11 AM  Performed by: Stevie Bobo MD  Authorized by: Stevie Bobo MD       Pre-procedure:    Indications: at surgeon's request and post-op pain management    Preanesthetic Checklist: patient identified, risks and benefits discussed, site marked, timeout performed, anesthesia consent given and patient being monitored    Timeout Time: 07:03          Block Type:   Block Type:  Axillary  Laterality:  Right  Monitoring:  Standard ASA monitoring, continuous pulse ox, frequent vital sign checks, heart rate, responsive to questions and oxygen  Injection Technique:  Single shot  Procedures: ultrasound guided and nerve stimulator    Patient Position: seated  Prep: chlorhexidine    Location:  Axilla  Needle Type:  Stimuplex  Needle Gauge:  22 G  Needle Localization:  Nerve stimulator and ultrasound guidance  Motor Response: minimal motor response >0.4 mA      Assessment:  Number of attempts:  1  Injection Assessment:  Incremental injection every 5 mL, local visualized surrounding nerve on ultrasound, negative aspiration for CSF, negative aspiration for blood, no paresthesia, no intravascular symptoms and ultrasound image on chart  Patient tolerance:  Patient tolerated the procedure well with no immediate complications

## 2020-06-23 NOTE — ANESTHESIA POSTPROCEDURE EVALUATION
Procedure(s):  RIGHT WRIST REMOVAL PLATE AND SCREWS.    total IV anesthesia    Anesthesia Post Evaluation      Multimodal analgesia: multimodal analgesia used between 6 hours prior to anesthesia start to PACU discharge  Patient location during evaluation: PACU  Patient participation: complete - patient participated  Level of consciousness: awake  Pain management: adequate  Airway patency: patent  Anesthetic complications: no  Cardiovascular status: acceptable  Respiratory status: spontaneous ventilation and acceptable  Hydration status: acceptable  Post anesthesia nausea and vomiting:  none      INITIAL Post-op Vital signs:   Vitals Value Taken Time   /63 6/23/2020  9:00 AM   Temp 36.6 °C (97.8 °F) 6/23/2020  8:46 AM   Pulse 75 6/23/2020  9:11 AM   Resp 16 6/23/2020  9:00 AM   SpO2 99 % 6/23/2020  9:11 AM   Vitals shown include unvalidated device data.

## 2020-06-23 NOTE — ANESTHESIA PREPROCEDURE EVALUATION
Relevant Problems   No relevant active problems       Anesthetic History   No history of anesthetic complications            Review of Systems / Medical History  Patient summary reviewed and pertinent labs reviewed    Pulmonary  Within defined limits                 Neuro/Psych         Headaches (Migraines)     Cardiovascular  Within defined limits                Exercise tolerance: >4 METS  Comments: Denies recent CP, SOB or Palpitations   GI/Hepatic/Renal  Within defined limits              Endo/Other  Within defined limits           Other Findings              Physical Exam    Airway  Mallampati: II  TM Distance: 4 - 6 cm  Neck ROM: normal range of motion   Mouth opening: Normal     Cardiovascular  Regular rate and rhythm,  S1 and S2 normal,  no murmur, click, rub, or gallop             Dental  No notable dental hx       Pulmonary  Breath sounds clear to auscultation               Abdominal  GI exam deferred       Other Findings            Anesthetic Plan    ASA: 1  Anesthesia type: total IV anesthesia      Post-op pain plan if not by surgeon: peripheral nerve block single    Induction: Intravenous  Anesthetic plan and risks discussed with: Patient

## 2020-06-23 NOTE — BRIEF OP NOTE
Brief Postoperative Note    Patient: Abigail Cruz  YOB: 1975  MRN: [id number]    Date of Procedure: 6/23/2020     Pre-Op Diagnosis: Breakdown (mechanical) of internal fixation device of other bones, initial encounter ([facility] [T84.218A]  Other intraarticular fracture of lower end of right radius, subsequent encounter for closed fracture with routine healing [S52.571D]    Post-Op Diagnosis: Same as preoperative diagnosis. Procedure(s):  RIGHT WRIST REMOVAL PLATE AND SCREWS    Surgeon(s):   Ruma Carballo MD    Surgical Assistant: None    Anesthesia: Regional     Estimated Blood Loss (mL): Minimal    Complications: None    Specimens: * No specimens in log *     Implants: * No implants in log *    Drains: * No LDAs found *    Findings: Healed fx of the right distal radius    Electronically Signed by Dorothea Sosa MD on 6/23/2020 at 8:51 AM

## 2020-06-23 NOTE — PERIOP NOTES
Pt's father has not arrived at hospital yet, phone call goes to voicemail. Pt prefers father not coming inside hospital, especially into [facility] PACU.   alerted of pt's request

## 2020-09-14 NOTE — PROGRESS NOTES
Deborrah Apley  : 1975  Primary: Eliazar Reid [facility]  Secondary:  [address] at [address], [address] [place] [address],[address], [facility] [address] [facility].  [phone]   [phone]       OUTPATIENT OCCUPATIONAL THERAPY: Daily Treatment Note 2019  Visit Count:  26  CD-10: Treatment Diagnosis: Pain in right wrist (M25.531)Stiffness of right wrist, not elsewhere classified (M25.631)  Precautions/Allergies:   Atropine and Periactin   TREATMENT PLAN:  Effective Dates: 2019 TO 12/15/2019 (90 days). Frequency/Duration: 2 times a week for 90 Day(s  Pre-treatment Symptoms/Complaints: Pt states, \"Am I really doing better? \"  Pain: Initial: Pain Intensity 1: 0  Post Session:  0/10   Medications Last Reviewed:  10/24//2019  Updated Objective Findings:  None Today   TREATMENT:       Manual Therapy: (Soft Tissue Mobilization Duration  Duration: 10 Minutes  Duration: 10 Minutes): Technique: Connective tissue, Retrograde massage  Tissue Mobilized: Connective tissue  RUE Soft Tissue Mobilization: Yes  Technique: Connective tissue, Retrograde massage  Tissue Mobilized: Scar/adhesion   Therapeutic Exercise:                                                                               : The patient was instructed in a home exercise program.                                                Date:  19 Date:  19 Date:  12/10/19 Date:  19 Date:  19 Date:  19 Date:  19 Date:  19   Activity/Exercise           AROM during Fluidotherapy 20 minutes 20 minutes 15 minutes 20 minutes 20 minutes 20 minutes 20 minutes 20 minutes   Paraffin with Stretch           Retrograde massage, Friction Scar massage, Joint Mobilization           Scarf Curl           Washer Game 5 reps 5 reps 5 reps 5 reps 5 reps 5 reps 5 reps    Individual Gripper           Hand Parshall 20 reps    30 lbs 20 reps    30 lbs 20 reps    30 lbs 20 reps    30 lbs 20 reps    30 lbs 20 reps    30 lbs 20 reps    30 lbs 20 reps    30 lbs   Cones           Putty           Clothes Pins  Blue x 30    Green x 30 Green x 30 Green x 30   A-R Bar 20 reps 20 reps with extra hold in supination 20 reps with extra hold in supination 20 reps with extra hold in supination 20 reps with extra hold in supination 20 reps with extra hold in supination 20 reps with extra hold in supination 20 reps with extra hold in supination   Exerstick 20 reps horizontal    20 reps vertical 20 reps horizontal    20 reps vertical 20 reps horizontal    20 reps vertical 20 reps horizontal    20 reps vertical 20 reps horizontal    20 reps vertical 20 reps horizontal    20 reps vertical 20 reps horizontal    20 reps vertical 20 reps horizontal    20 reps vertical   Sup/pro-Roll           PROM Ex 5 reps wrist flexion/extension 5 reps wrist flexion/extension 5 reps wrist flexion/extension 5 reps wrist flexion/extension 10 reps wrist flexion/extension 10 reps wrist flexion/extension 10 reps wrist flexion/extension 10 reps wrist flexion/extension   RESISTIVE EXERCISES           WEIGHT WELL           Sup/Pro           UD/RD           Wrist Flex/Ext           Free Weights 2 pounds  Wrist flexion and extension, radial/ulnar deviation  2 x 10 each  2 pounds  Wrist flexion and extension 2 pounds  Wrist flexion and extension 2 pounds  Wrist flexion and extension 2 pounds x 10 reps  1 pound x 10 reps  Wrist flexion and extension 2 pounds   2 x 10 reps    Wrist flexion and extension 2 pounds   2 x 10 reps    Wrist flexion and extension   UBE(Minutes)           Nautilus           Compound Row           Vertical Chest           [employer]           Tennis ball toss and catch with right hand X 20 reps X 20 reps  X 20 reps X 20 reps X 20 reps       HEP: As above; handouts given to patient for all exercises.     Therapeutic Modalities:      Right Wrist Heat  Type: Paraffin bath  Duration : 15 minutes  Patient Position: Sitting                                       With 10 pound weighted supination stretch    Joint Mobilization:        Treatment Times:  · Therapeutic Exercise: 35 minutes  · Manual Therapy: 10 minutes  · Parafin:15  minutes  · Whirlpool:  minutes  · Other:  minutes    Treatment/Session Summary:    · Response to Treatment:  Patients tolerated treatment well with no complications. Upon completion of treatment, skin condition was normal. Patient demonstrating improving functional movement and coordination of the right upper extremity. Tolerating progressive passive stretches into wrist flexion, extension and supination without complaint of increased pain. Continue to work on improving self range of motion in HEP. Communication/Consultation:  none today  · Equipment provided today:  None today. · Recommendations/Intent for next treatment session: Next visit will focus on Increasing motion, strength and function in her right UE, and decreasing pain. .    Total Treatment Billable Duration:  60 minutes  OT Patient Time In/Time Out  Time In: 1100  Time Out: Tiffany Plummer 85, OT    Future Appointments   Date Time Provider Lori Cummins   12/30/2019  8:45 AM Marylen Fife, OT [facility]   1/2/2020 11:00 AM Ressie Morales, OT [facility]   1/6/2020  3:15 PM Ressie Morales, OT [facility]   1/8/2020  3:15 PM Ressie Morales, OT [facility]   1/13/2020  3:15 PM Ressie Morales, OT [facility]   1/16/2020  3:15 PM Ressie Morales, OT [facility]   1/20/2020  3:15 PM Ressie Morales, OT [facility]   1/22/2020  8:00 AM Ressie Morales, OT [facility]   1/27/2020  3:15 PM Ressie Morales, OT [facility]   1/30/2020  3:15 PM Ressie Morales, OT [facility] 18-Aug-2011

## 2021-11-05 ENCOUNTER — HOSPITAL ENCOUNTER (OUTPATIENT)
Dept: MAMMOGRAPHY | Age: 46
Discharge: HOME | End: 2021-11-05
Payer: COMMERCIAL

## 2021-11-05 DIAGNOSIS — Z12.39 ENCOUNTER FOR SCREENING FOR MALIGNANT NEOPLASM OF BREAST: ICD-10-CM

## 2021-11-05 PROCEDURE — 77063 BREAST TOMOSYNTHESIS BI: CPT

## 2021-11-05 PROCEDURE — 77067 SCR MAMMO BI INCL CAD: CPT

## 2023-01-16 ENCOUNTER — HOSPITAL ENCOUNTER (OUTPATIENT)
Dept: MAMMOGRAPHY | Age: 48
Discharge: HOME | End: 2023-01-19
Payer: COMMERCIAL

## 2023-01-16 DIAGNOSIS — Z12.31 VISIT FOR SCREENING MAMMOGRAM: ICD-10-CM

## 2023-01-16 PROCEDURE — 77067 SCR MAMMO BI INCL CAD: CPT

## 2024-06-03 ENCOUNTER — TRANSCRIBE ORDERS (OUTPATIENT)
Dept: SCHEDULING | Age: 49
End: 2024-06-03

## 2024-06-03 DIAGNOSIS — Z12.31 SCREENING MAMMOGRAM FOR BREAST CANCER: Primary | ICD-10-CM

## 2024-06-14 ENCOUNTER — HOSPITAL ENCOUNTER (OUTPATIENT)
Dept: MAMMOGRAPHY | Age: 49
Discharge: HOME | End: 2024-06-14
Payer: COMMERCIAL

## 2024-06-14 VITALS — HEIGHT: 67 IN | WEIGHT: 160 LBS | BODY MASS INDEX: 25.11 KG/M2

## 2024-06-14 DIAGNOSIS — Z12.31 SCREENING MAMMOGRAM FOR BREAST CANCER: ICD-10-CM

## 2024-06-14 PROCEDURE — 77063 BREAST TOMOSYNTHESIS BI: CPT

## (undated) DEVICE — SUTURE PDS II SZ 3-0 L27IN ABSRB VLT L26MM SH 1/2 CIR Z316H

## (undated) DEVICE — SPLINT THMB W3XL12IN FBRGLS PD PRECUT LTWT DURABLE FAST SET

## (undated) DEVICE — STERILE HOOK LOCK LATEX FREE ELASTIC BANDAGE 2INX5YD: Brand: HOOK LOCK™

## (undated) DEVICE — SUTURE ETHLN SZ 4-0 L18IN NONABSORBABLE BLK L19MM PS-2 3/8 1667H

## (undated) DEVICE — OCCLUSIVE GAUZE STRIP,3% BISMUTH TRIBROMOPHENATE IN PETROLATUM BLEND: Brand: XEROFORM

## (undated) DEVICE — STERILE HOOK LOCK LATEX FREE ELASTIC BANDAGE 3INX5YD: Brand: HOOK LOCK™

## (undated) DEVICE — (D)PREP SKN CHLRAPRP APPL 26ML -- CONVERT TO ITEM 371833

## (undated) DEVICE — WIRE K 1.1X100MM --
Type: IMPLANTABLE DEVICE | Site: ARM | Status: NON-FUNCTIONAL
Removed: 2019-07-30

## (undated) DEVICE — REM POLYHESIVE ADULT PATIENT RETURN ELECTRODE: Brand: VALLEYLAB

## (undated) DEVICE — SLING ARM 2-37INX8-17IN PCH -- MED

## (undated) DEVICE — KENDALL SCD EXPRESS SLEEVES, KNEE LENGTH, MEDIUM: Brand: KENDALL SCD

## (undated) DEVICE — PADDING CAST W2INXL4YD ST COT COHESIVE HND TEARABLE SPEC

## (undated) DEVICE — SUTURE MCRYL SZ 4-0 L27IN ABSRB UD L19MM PS-2 1/2 CIR PRIM Y426H

## (undated) DEVICE — 3M™ TEGADERM™ TRANSPARENT FILM DRESSING FRAME STYLE, 1626W, 4 IN X 4-3/4 IN (10 CM X 12 CM), 50/CT 4CT/CASE: Brand: 3M™ TEGADERM™

## (undated) DEVICE — BUTTON SWITCH PENCIL BLADE ELECTRODE, HOLSTER: Brand: EDGE

## (undated) DEVICE — 2000CC GUARDIAN II: Brand: GUARDIAN

## (undated) DEVICE — INTENDED FOR TISSUE SEPARATION, AND OTHER PROCEDURES THAT REQUIRE A SHARP SURGICAL BLADE TO PUNCTURE OR CUT.: Brand: BARD-PARKER ® STAINLESS STEEL BLADES

## (undated) DEVICE — SLING ARM AD ULT

## (undated) DEVICE — PADDING CAST W4INXL4YD ST COT COHESIVE HND TEARABLE SPEC

## (undated) DEVICE — DRAPE XR C ARM 41X74IN LF --

## (undated) DEVICE — (D)STRIP SKN CLSR 0.5X4IN WHT --

## (undated) DEVICE — SOLUTION IV 1000ML 0.9% SOD CHL

## (undated) DEVICE — NEEDLE HYPO 25GA L1.5IN BLU POLYPR HUB S STL REG BVL STR

## (undated) DEVICE — TRAP TRAC L FNGR MESH SGL COLOR CODED MIX MTCH DISPOSABLE

## (undated) DEVICE — BIT DRL TWST 1.8X90MM DISP --

## (undated) DEVICE — MASTISOL ADHESIVE LIQ 2/3ML

## (undated) DEVICE — BIT DRL TWST 2.3X80MM DISP -- TRIMED

## (undated) DEVICE — DISPOSABLE BIPOLAR CODE, 12' (3.66 M): Brand: CONMED

## (undated) DEVICE — AMD ANTIMICROBIAL GAUZE SPONGES,12 PLY USP TYPE VII, 0.2% POLYHEXAMETHYLENE BIGUANIDE HCI (PHMB): Brand: CURITY

## (undated) DEVICE — SUTURE STRATAFIX SPRL MCRYL + SZ 4-0 L12IN ABSRB UD PS-2 SXMP1B117

## (undated) DEVICE — BNDG ELAS ESMARK 4INX12FT LF -- STRL

## (undated) DEVICE — T-DRAPE,EXTREMITY,STERILE: Brand: MEDLINE

## (undated) DEVICE — SURGICAL PROCEDURE PACK BASIC ST FRANCIS

## (undated) DEVICE — ZIMMER® STERILE DISPOSABLE TOURNIQUET CUFF WITH PLC, DUAL PORT, SINGLE BLADDER, 18 IN. (46 CM)

## (undated) DEVICE — APPLICATOR BNDG 1MM ADH PREMIERPRO EXOFIN